# Patient Record
Sex: FEMALE | Race: BLACK OR AFRICAN AMERICAN | NOT HISPANIC OR LATINO | URBAN - METROPOLITAN AREA
[De-identification: names, ages, dates, MRNs, and addresses within clinical notes are randomized per-mention and may not be internally consistent; named-entity substitution may affect disease eponyms.]

---

## 2018-01-28 ENCOUNTER — EMERGENCY (EMERGENCY)
Facility: HOSPITAL | Age: 78
LOS: 1 days | Discharge: ANOTHER HOSPITAL | End: 2018-01-28
Attending: EMERGENCY MEDICINE
Payer: COMMERCIAL

## 2018-01-28 VITALS
SYSTOLIC BLOOD PRESSURE: 183 MMHG | RESPIRATION RATE: 18 BRPM | OXYGEN SATURATION: 100 % | DIASTOLIC BLOOD PRESSURE: 79 MMHG | HEART RATE: 57 BPM | TEMPERATURE: 98 F

## 2018-01-28 LAB
ALBUMIN SERPL ELPH-MCNC: 4.1 G/DL — SIGNIFICANT CHANGE UP (ref 3.5–5)
ALP SERPL-CCNC: 56 U/L — SIGNIFICANT CHANGE UP (ref 40–120)
ALT FLD-CCNC: 36 U/L DA — SIGNIFICANT CHANGE UP (ref 10–60)
ANION GAP SERPL CALC-SCNC: 9 MMOL/L — SIGNIFICANT CHANGE UP (ref 5–17)
AST SERPL-CCNC: 52 U/L — HIGH (ref 10–40)
BILIRUB SERPL-MCNC: 0.5 MG/DL — SIGNIFICANT CHANGE UP (ref 0.2–1.2)
BUN SERPL-MCNC: 7 MG/DL — SIGNIFICANT CHANGE UP (ref 7–18)
CALCIUM SERPL-MCNC: 9.6 MG/DL — SIGNIFICANT CHANGE UP (ref 8.4–10.5)
CHLORIDE SERPL-SCNC: 98 MMOL/L — SIGNIFICANT CHANGE UP (ref 96–108)
CO2 SERPL-SCNC: 25 MMOL/L — SIGNIFICANT CHANGE UP (ref 22–31)
CREAT SERPL-MCNC: 0.72 MG/DL — SIGNIFICANT CHANGE UP (ref 0.5–1.3)
GLUCOSE SERPL-MCNC: 121 MG/DL — HIGH (ref 70–99)
HCT VFR BLD CALC: 47.9 % — HIGH (ref 34.5–45)
HGB BLD-MCNC: 15.2 G/DL — SIGNIFICANT CHANGE UP (ref 11.5–15.5)
LIDOCAIN IGE QN: 69 U/L — LOW (ref 73–393)
MCHC RBC-ENTMCNC: 30 PG — SIGNIFICANT CHANGE UP (ref 27–34)
MCHC RBC-ENTMCNC: 31.8 GM/DL — LOW (ref 32–36)
MCV RBC AUTO: 94.3 FL — SIGNIFICANT CHANGE UP (ref 80–100)
PLATELET # BLD AUTO: 259 K/UL — SIGNIFICANT CHANGE UP (ref 150–400)
POTASSIUM SERPL-MCNC: 4.8 MMOL/L — SIGNIFICANT CHANGE UP (ref 3.5–5.3)
POTASSIUM SERPL-SCNC: 4.8 MMOL/L — SIGNIFICANT CHANGE UP (ref 3.5–5.3)
PROT SERPL-MCNC: 8.9 G/DL — HIGH (ref 6–8.3)
RBC # BLD: 5.08 M/UL — SIGNIFICANT CHANGE UP (ref 3.8–5.2)
RBC # FLD: 12.7 % — SIGNIFICANT CHANGE UP (ref 10.3–14.5)
SODIUM SERPL-SCNC: 132 MMOL/L — LOW (ref 135–145)
WBC # BLD: 10.2 K/UL — SIGNIFICANT CHANGE UP (ref 3.8–10.5)
WBC # FLD AUTO: 10.2 K/UL — SIGNIFICANT CHANGE UP (ref 3.8–10.5)

## 2018-01-28 PROCEDURE — 96376 TX/PRO/DX INJ SAME DRUG ADON: CPT

## 2018-01-28 PROCEDURE — 96375 TX/PRO/DX INJ NEW DRUG ADDON: CPT

## 2018-01-28 PROCEDURE — 99284 EMERGENCY DEPT VISIT MOD MDM: CPT | Mod: 25

## 2018-01-28 PROCEDURE — 96374 THER/PROPH/DIAG INJ IV PUSH: CPT | Mod: XU

## 2018-01-28 PROCEDURE — 74177 CT ABD & PELVIS W/CONTRAST: CPT | Mod: 26

## 2018-01-28 PROCEDURE — 80053 COMPREHEN METABOLIC PANEL: CPT

## 2018-01-28 PROCEDURE — 85027 COMPLETE CBC AUTOMATED: CPT

## 2018-01-28 PROCEDURE — 74177 CT ABD & PELVIS W/CONTRAST: CPT

## 2018-01-28 PROCEDURE — 99285 EMERGENCY DEPT VISIT HI MDM: CPT

## 2018-01-28 PROCEDURE — 83690 ASSAY OF LIPASE: CPT

## 2018-01-28 RX ORDER — KETOROLAC TROMETHAMINE 30 MG/ML
15 SYRINGE (ML) INJECTION ONCE
Qty: 0 | Refills: 0 | Status: DISCONTINUED | OUTPATIENT
Start: 2018-01-28 | End: 2018-01-28

## 2018-01-28 RX ORDER — METOCLOPRAMIDE HCL 10 MG
10 TABLET ORAL ONCE
Qty: 0 | Refills: 0 | Status: COMPLETED | OUTPATIENT
Start: 2018-01-28 | End: 2018-01-28

## 2018-01-28 RX ORDER — SODIUM CHLORIDE 9 MG/ML
2000 INJECTION INTRAMUSCULAR; INTRAVENOUS; SUBCUTANEOUS ONCE
Qty: 0 | Refills: 0 | Status: COMPLETED | OUTPATIENT
Start: 2018-01-28 | End: 2018-01-28

## 2018-01-28 RX ORDER — MORPHINE SULFATE 50 MG/1
4 CAPSULE, EXTENDED RELEASE ORAL ONCE
Qty: 0 | Refills: 0 | Status: DISCONTINUED | OUTPATIENT
Start: 2018-01-28 | End: 2018-01-28

## 2018-01-28 RX ORDER — ONDANSETRON 8 MG/1
4 TABLET, FILM COATED ORAL ONCE
Qty: 0 | Refills: 0 | Status: COMPLETED | OUTPATIENT
Start: 2018-01-28 | End: 2018-01-28

## 2018-01-28 RX ADMIN — MORPHINE SULFATE 4 MILLIGRAM(S): 50 CAPSULE, EXTENDED RELEASE ORAL at 19:34

## 2018-01-28 RX ADMIN — SODIUM CHLORIDE 2000 MILLILITER(S): 9 INJECTION INTRAMUSCULAR; INTRAVENOUS; SUBCUTANEOUS at 19:02

## 2018-01-28 RX ADMIN — Medication 10 MILLIGRAM(S): at 20:31

## 2018-01-28 RX ADMIN — Medication 15 MILLIGRAM(S): at 19:02

## 2018-01-28 RX ADMIN — MORPHINE SULFATE 4 MILLIGRAM(S): 50 CAPSULE, EXTENDED RELEASE ORAL at 20:36

## 2018-01-28 RX ADMIN — ONDANSETRON 4 MILLIGRAM(S): 8 TABLET, FILM COATED ORAL at 19:34

## 2018-01-28 RX ADMIN — Medication 15 MILLIGRAM(S): at 20:36

## 2018-01-28 RX ADMIN — ONDANSETRON 4 MILLIGRAM(S): 8 TABLET, FILM COATED ORAL at 19:02

## 2018-01-28 NOTE — ED PROVIDER NOTE - GASTROINTESTINAL, MLM
Bilateral lower abdomen tenderness or cva tenderness Bilateral lower abdomen tenderness no cva tenderness

## 2018-01-28 NOTE — ED PROVIDER NOTE - MEDICAL DECISION MAKING DETAILS
Pt is a 78 y/o F c/o abdominal pain. Concern of atrial abdominal process r/o obstruction appendicitis and reassess after result of ct abdomen and pelvis.

## 2018-01-28 NOTE — ED PROVIDER NOTE - OBJECTIVE STATEMENT
Pt is a 76 y/o F with no significant PMHx of and no significant PSHx presents to the ED c/o vomiting and abdominal pain x 1 day. Pt states that she came from an urgent care. Pt reports that her pain started before vomiting and is currently present as well. Pt denies f/c, cough, diarrhea or any other complaints. NKDA or allergies.

## 2018-01-29 VITALS
DIASTOLIC BLOOD PRESSURE: 81 MMHG | RESPIRATION RATE: 18 BRPM | HEART RATE: 60 BPM | SYSTOLIC BLOOD PRESSURE: 170 MMHG | OXYGEN SATURATION: 100 % | TEMPERATURE: 98 F

## 2018-01-30 ENCOUNTER — INPATIENT (INPATIENT)
Facility: HOSPITAL | Age: 78
LOS: 8 days | Discharge: HOME | End: 2018-02-08
Attending: INTERNAL MEDICINE | Admitting: INTERNAL MEDICINE
Payer: MEDICARE

## 2018-01-30 ENCOUNTER — EMERGENCY (EMERGENCY)
Facility: HOSPITAL | Age: 78
LOS: 1 days | Discharge: ANOTHER HOSPITAL | End: 2018-01-30
Attending: EMERGENCY MEDICINE | Admitting: EMERGENCY MEDICINE
Payer: COMMERCIAL

## 2018-01-30 VITALS
TEMPERATURE: 99 F | HEART RATE: 126 BPM | DIASTOLIC BLOOD PRESSURE: 93 MMHG | WEIGHT: 119.93 LBS | HEIGHT: 63 IN | SYSTOLIC BLOOD PRESSURE: 147 MMHG | RESPIRATION RATE: 18 BRPM | OXYGEN SATURATION: 98 %

## 2018-01-30 VITALS
OXYGEN SATURATION: 97 % | DIASTOLIC BLOOD PRESSURE: 76 MMHG | SYSTOLIC BLOOD PRESSURE: 146 MMHG | TEMPERATURE: 101 F | HEART RATE: 120 BPM | RESPIRATION RATE: 18 BRPM

## 2018-01-30 VITALS
DIASTOLIC BLOOD PRESSURE: 58 MMHG | HEART RATE: 84 BPM | TEMPERATURE: 98 F | WEIGHT: 108.03 LBS | RESPIRATION RATE: 17 BRPM | SYSTOLIC BLOOD PRESSURE: 103 MMHG | HEIGHT: 60 IN | OXYGEN SATURATION: 97 %

## 2018-01-30 DIAGNOSIS — I21.4 NON-ST ELEVATION (NSTEMI) MYOCARDIAL INFARCTION: ICD-10-CM

## 2018-01-30 DIAGNOSIS — Z98.890 OTHER SPECIFIED POSTPROCEDURAL STATES: Chronic | ICD-10-CM

## 2018-01-30 DIAGNOSIS — E86.0 DEHYDRATION: ICD-10-CM

## 2018-01-30 DIAGNOSIS — I10 ESSENTIAL (PRIMARY) HYPERTENSION: ICD-10-CM

## 2018-01-30 DIAGNOSIS — R11.2 NAUSEA WITH VOMITING, UNSPECIFIED: ICD-10-CM

## 2018-01-30 DIAGNOSIS — R10.84 GENERALIZED ABDOMINAL PAIN: ICD-10-CM

## 2018-01-30 DIAGNOSIS — R50.9 FEVER, UNSPECIFIED: ICD-10-CM

## 2018-01-30 LAB
ALBUMIN SERPL ELPH-MCNC: 4.2 G/DL — SIGNIFICANT CHANGE UP (ref 3.5–5)
ALP SERPL-CCNC: 59 U/L — SIGNIFICANT CHANGE UP (ref 40–120)
ALT FLD-CCNC: 44 U/L DA — SIGNIFICANT CHANGE UP (ref 10–60)
ANION GAP SERPL CALC-SCNC: 10 MMOL/L — SIGNIFICANT CHANGE UP (ref 5–17)
APPEARANCE UR: CLEAR — SIGNIFICANT CHANGE UP
AST SERPL-CCNC: 60 U/L — HIGH (ref 10–40)
B PERT DNA SPEC QL NAA+PROBE: SIGNIFICANT CHANGE UP
BASOPHILS # BLD AUTO: 0.1 K/UL — SIGNIFICANT CHANGE UP (ref 0–0.2)
BASOPHILS NFR BLD AUTO: 0.8 % — SIGNIFICANT CHANGE UP (ref 0–2)
BILIRUB SERPL-MCNC: 0.8 MG/DL — SIGNIFICANT CHANGE UP (ref 0.2–1.2)
BILIRUB UR-MCNC: NEGATIVE — SIGNIFICANT CHANGE UP
BUN SERPL-MCNC: 11 MG/DL — SIGNIFICANT CHANGE UP (ref 7–18)
C PNEUM DNA SPEC QL NAA+PROBE: NOT DETECTED — SIGNIFICANT CHANGE UP
CALCIUM SERPL-MCNC: 9.5 MG/DL — SIGNIFICANT CHANGE UP (ref 8.4–10.5)
CHLORIDE SERPL-SCNC: 97 MMOL/L — SIGNIFICANT CHANGE UP (ref 96–108)
CO2 SERPL-SCNC: 29 MMOL/L — SIGNIFICANT CHANGE UP (ref 22–31)
COLOR SPEC: YELLOW — SIGNIFICANT CHANGE UP
CREAT SERPL-MCNC: 0.74 MG/DL — SIGNIFICANT CHANGE UP (ref 0.5–1.3)
DIFF PNL FLD: ABNORMAL
EOSINOPHIL # BLD AUTO: 0 K/UL — SIGNIFICANT CHANGE UP (ref 0–0.5)
EOSINOPHIL NFR BLD AUTO: 0 % — SIGNIFICANT CHANGE UP (ref 0–6)
FLUAV H1 2009 PAND RNA SPEC QL NAA+PROBE: NOT DETECTED — SIGNIFICANT CHANGE UP
FLUAV H1 RNA SPEC QL NAA+PROBE: NOT DETECTED — SIGNIFICANT CHANGE UP
FLUAV H3 RNA SPEC QL NAA+PROBE: NOT DETECTED — SIGNIFICANT CHANGE UP
FLUAV SUBTYP SPEC NAA+PROBE: SIGNIFICANT CHANGE UP
FLUBV RNA SPEC QL NAA+PROBE: NOT DETECTED — SIGNIFICANT CHANGE UP
GLUCOSE SERPL-MCNC: 115 MG/DL — HIGH (ref 70–99)
GLUCOSE UR QL: NEGATIVE — SIGNIFICANT CHANGE UP
HADV DNA SPEC QL NAA+PROBE: NOT DETECTED — SIGNIFICANT CHANGE UP
HCOV 229E RNA SPEC QL NAA+PROBE: NOT DETECTED — SIGNIFICANT CHANGE UP
HCOV HKU1 RNA SPEC QL NAA+PROBE: NOT DETECTED — SIGNIFICANT CHANGE UP
HCOV NL63 RNA SPEC QL NAA+PROBE: NOT DETECTED — SIGNIFICANT CHANGE UP
HCOV OC43 RNA SPEC QL NAA+PROBE: NOT DETECTED — SIGNIFICANT CHANGE UP
HCT VFR BLD CALC: 52.3 % — HIGH (ref 34.5–45)
HGB BLD-MCNC: 16.6 G/DL — HIGH (ref 11.5–15.5)
HMPV RNA SPEC QL NAA+PROBE: NOT DETECTED — SIGNIFICANT CHANGE UP
HPIV1 RNA SPEC QL NAA+PROBE: NOT DETECTED — SIGNIFICANT CHANGE UP
HPIV2 RNA SPEC QL NAA+PROBE: NOT DETECTED — SIGNIFICANT CHANGE UP
HPIV3 RNA SPEC QL NAA+PROBE: NOT DETECTED — SIGNIFICANT CHANGE UP
HPIV4 RNA SPEC QL NAA+PROBE: NOT DETECTED — SIGNIFICANT CHANGE UP
KETONES UR-MCNC: ABNORMAL
LACTATE SERPL-SCNC: 1.8 MMOL/L — SIGNIFICANT CHANGE UP (ref 0.7–2)
LEUKOCYTE ESTERASE UR-ACNC: ABNORMAL
LIDOCAIN IGE QN: 79 U/L — SIGNIFICANT CHANGE UP (ref 73–393)
LYMPHOCYTES # BLD AUTO: 1.6 K/UL — SIGNIFICANT CHANGE UP (ref 1–3.3)
LYMPHOCYTES # BLD AUTO: 12.2 % — LOW (ref 13–44)
M PNEUMO DNA SPEC QL NAA+PROBE: NOT DETECTED — SIGNIFICANT CHANGE UP
MCHC RBC-ENTMCNC: 29.3 PG — SIGNIFICANT CHANGE UP (ref 27–34)
MCHC RBC-ENTMCNC: 31.7 GM/DL — LOW (ref 32–36)
MCV RBC AUTO: 92.3 FL — SIGNIFICANT CHANGE UP (ref 80–100)
MONOCYTES # BLD AUTO: 0.9 K/UL — SIGNIFICANT CHANGE UP (ref 0–0.9)
MONOCYTES NFR BLD AUTO: 7.3 % — SIGNIFICANT CHANGE UP (ref 2–14)
NEUTROPHILS # BLD AUTO: 10.1 K/UL — HIGH (ref 1.8–7.4)
NEUTROPHILS NFR BLD AUTO: 79.6 % — HIGH (ref 43–77)
NITRITE UR-MCNC: NEGATIVE — SIGNIFICANT CHANGE UP
PH UR: 8 — SIGNIFICANT CHANGE UP (ref 5–8)
PLATELET # BLD AUTO: 267 K/UL — SIGNIFICANT CHANGE UP (ref 150–400)
POTASSIUM SERPL-MCNC: 4 MMOL/L — SIGNIFICANT CHANGE UP (ref 3.5–5.3)
POTASSIUM SERPL-SCNC: 4 MMOL/L — SIGNIFICANT CHANGE UP (ref 3.5–5.3)
PROT SERPL-MCNC: 8.8 G/DL — HIGH (ref 6–8.3)
PROT UR-MCNC: 30 MG/DL
RBC # BLD: 5.67 M/UL — HIGH (ref 3.8–5.2)
RBC # FLD: 12.8 % — SIGNIFICANT CHANGE UP (ref 10.3–14.5)
RSV RNA SPEC QL NAA+PROBE: NOT DETECTED — SIGNIFICANT CHANGE UP
RV+EV RNA SPEC QL NAA+PROBE: NOT DETECTED — SIGNIFICANT CHANGE UP
SODIUM SERPL-SCNC: 136 MMOL/L — SIGNIFICANT CHANGE UP (ref 135–145)
SP GR SPEC: 1.01 — SIGNIFICANT CHANGE UP (ref 1.01–1.02)
TROPONIN I SERPL-MCNC: 1.73 NG/ML — HIGH (ref 0–0.04)
UROBILINOGEN FLD QL: NEGATIVE — SIGNIFICANT CHANGE UP
WBC # BLD: 12.7 K/UL — HIGH (ref 3.8–10.5)
WBC # FLD AUTO: 12.7 K/UL — HIGH (ref 3.8–10.5)

## 2018-01-30 PROCEDURE — 99285 EMERGENCY DEPT VISIT HI MDM: CPT

## 2018-01-30 PROCEDURE — 93458 L HRT ARTERY/VENTRICLE ANGIO: CPT | Mod: 26,GC

## 2018-01-30 PROCEDURE — 83605 ASSAY OF LACTIC ACID: CPT

## 2018-01-30 PROCEDURE — 85027 COMPLETE CBC AUTOMATED: CPT

## 2018-01-30 PROCEDURE — 99285 EMERGENCY DEPT VISIT HI MDM: CPT | Mod: 25

## 2018-01-30 PROCEDURE — 96374 THER/PROPH/DIAG INJ IV PUSH: CPT

## 2018-01-30 PROCEDURE — 96375 TX/PRO/DX INJ NEW DRUG ADDON: CPT

## 2018-01-30 PROCEDURE — 83690 ASSAY OF LIPASE: CPT

## 2018-01-30 PROCEDURE — 80053 COMPREHEN METABOLIC PANEL: CPT

## 2018-01-30 PROCEDURE — 93005 ELECTROCARDIOGRAM TRACING: CPT

## 2018-01-30 PROCEDURE — 96376 TX/PRO/DX INJ SAME DRUG ADON: CPT

## 2018-01-30 PROCEDURE — 81001 URINALYSIS AUTO W/SCOPE: CPT

## 2018-01-30 PROCEDURE — 84484 ASSAY OF TROPONIN QUANT: CPT

## 2018-01-30 RX ORDER — SODIUM CHLORIDE 9 MG/ML
3 INJECTION INTRAMUSCULAR; INTRAVENOUS; SUBCUTANEOUS EVERY 8 HOURS
Qty: 0 | Refills: 0 | Status: DISCONTINUED | OUTPATIENT
Start: 2018-01-30 | End: 2018-02-08

## 2018-01-30 RX ORDER — FAMOTIDINE 10 MG/ML
20 INJECTION INTRAVENOUS ONCE
Qty: 0 | Refills: 0 | Status: COMPLETED | OUTPATIENT
Start: 2018-01-30 | End: 2018-01-30

## 2018-01-30 RX ORDER — ASPIRIN/CALCIUM CARB/MAGNESIUM 324 MG
81 TABLET ORAL DAILY
Qty: 0 | Refills: 0 | Status: DISCONTINUED | OUTPATIENT
Start: 2018-01-31 | End: 2018-02-08

## 2018-01-30 RX ORDER — AMLODIPINE BESYLATE 2.5 MG/1
5 TABLET ORAL DAILY
Qty: 0 | Refills: 0 | Status: DISCONTINUED | OUTPATIENT
Start: 2018-01-30 | End: 2018-02-08

## 2018-01-30 RX ORDER — ACETAMINOPHEN 500 MG
975 TABLET ORAL ONCE
Qty: 0 | Refills: 0 | Status: COMPLETED | OUTPATIENT
Start: 2018-01-30 | End: 2018-01-30

## 2018-01-30 RX ORDER — CLOPIDOGREL BISULFATE 75 MG/1
300 TABLET, FILM COATED ORAL ONCE
Qty: 0 | Refills: 0 | Status: COMPLETED | OUTPATIENT
Start: 2018-01-30 | End: 2018-01-30

## 2018-01-30 RX ORDER — ONDANSETRON 8 MG/1
4 TABLET, FILM COATED ORAL ONCE
Qty: 0 | Refills: 0 | Status: COMPLETED | OUTPATIENT
Start: 2018-01-30 | End: 2018-01-30

## 2018-01-30 RX ORDER — SODIUM CHLORIDE 9 MG/ML
1000 INJECTION INTRAMUSCULAR; INTRAVENOUS; SUBCUTANEOUS ONCE
Qty: 0 | Refills: 0 | Status: COMPLETED | OUTPATIENT
Start: 2018-01-30 | End: 2018-01-30

## 2018-01-30 RX ORDER — METOPROLOL TARTRATE 50 MG
12.5 TABLET ORAL
Qty: 0 | Refills: 0 | Status: DISCONTINUED | OUTPATIENT
Start: 2018-01-30 | End: 2018-02-08

## 2018-01-30 RX ORDER — ASPIRIN/CALCIUM CARB/MAGNESIUM 324 MG
325 TABLET ORAL ONCE
Qty: 0 | Refills: 0 | Status: COMPLETED | OUTPATIENT
Start: 2018-01-30 | End: 2018-01-30

## 2018-01-30 RX ADMIN — AMLODIPINE BESYLATE 5 MILLIGRAM(S): 2.5 TABLET ORAL at 18:19

## 2018-01-30 RX ADMIN — Medication 1 MILLIGRAM(S): at 10:40

## 2018-01-30 RX ADMIN — CLOPIDOGREL BISULFATE 300 MILLIGRAM(S): 75 TABLET, FILM COATED ORAL at 12:46

## 2018-01-30 RX ADMIN — FAMOTIDINE 20 MILLIGRAM(S): 10 INJECTION INTRAVENOUS at 10:39

## 2018-01-30 RX ADMIN — Medication 12.5 MILLIGRAM(S): at 18:19

## 2018-01-30 RX ADMIN — Medication 325 MILLIGRAM(S): at 11:33

## 2018-01-30 RX ADMIN — ONDANSETRON 4 MILLIGRAM(S): 8 TABLET, FILM COATED ORAL at 12:46

## 2018-01-30 RX ADMIN — Medication 975 MILLIGRAM(S): at 11:33

## 2018-01-30 RX ADMIN — SODIUM CHLORIDE 2000 MILLILITER(S): 9 INJECTION INTRAMUSCULAR; INTRAVENOUS; SUBCUTANEOUS at 10:46

## 2018-01-30 RX ADMIN — ONDANSETRON 4 MILLIGRAM(S): 8 TABLET, FILM COATED ORAL at 10:40

## 2018-01-30 RX ADMIN — ONDANSETRON 4 MILLIGRAM(S): 8 TABLET, FILM COATED ORAL at 17:44

## 2018-01-30 RX ADMIN — SODIUM CHLORIDE 3 MILLILITER(S): 9 INJECTION INTRAMUSCULAR; INTRAVENOUS; SUBCUTANEOUS at 22:02

## 2018-01-30 NOTE — H&P CARDIOLOGY - HISTORY OF PRESENT ILLNESS
77 year old female with HTN who presented to [facility] ED 1/28/18 c/o nausea and abdominal pain with subjective fevers and chills x3days, CT abd/pelvis negative and discharged to home, subsequently re-presented to [facility] ED 1/30 c/o persistent symptoms with increased intensity. R/I for MI with first troponin 1.7 with EKG with mild SNOW. Patient given NS IVG bolus, Zofran and Tylenol, as well as lorazepam for abd muscle relaxation, with improvement in symptoms along with ASA. Patient urgently transferred to [facility] with an urgent cardiac catheterization on arrival via RRA access revealing EF and normal coronary arteries. , to be started on ACE-I and BB as per Dr VASILE Red and to be admitted to telemetry for further management.   Patient currently asymptomatic, chest pain free and afebrile. 77 year old female with HTN who presented to [facility] ED 1/28/18 c/o nausea and abdominal pain with subjective fevers and chills x3days, CT abd/pelvis No bowel obstruction. No wall thickening or pericolonic inflammatory change. Mild intrahepatic biliary duct dilatation of uncertain etiology. No gallstones or CBD dilatation. 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   Patient was subsequently discharged to home, subsequently re-presented to [facility] ED 1/30 c/o persistent symptoms with increased intensity. R/I for MI with first troponin 1.7 with EKG with mild SNOW. Patient given NS IVG bolus, Zofran and Tylenol, as well as lorazepam for abd muscle relaxation, with improvement in symptoms along with ASA. Patient urgently transferred to [facility] with an urgent cardiac catheterization on arrival via RRA access revealing EF 40%, LVEDP 15, normal coronary arteries and to be started on ACE-I and BB as per Dr VASILE Red and to be admitted to telemetry for further management.   Patient currently asymptomatic, chest pain and abdominal pain free and afebrile.

## 2018-01-30 NOTE — H&P CARDIOLOGY - COMMENTS
RVP swab ordered  TTE ordered  will start low dose BB and ACE-I as discussed with Dr. VASILE Red  admit to tele

## 2018-01-30 NOTE — ED PROVIDER NOTE - CHPI ED SYMPTOMS NEG
no chest pain, no shortness of breath, no leg pain, no leg swelling/no dysuria/no hematuria/no diarrhea

## 2018-01-30 NOTE — ED PROVIDER NOTE - PHYSICAL EXAMINATION
Afebrile, hemodynamically stable  NAD, ill but non-toxic appearing  Head NCAT  EOMI  MM mildly dry  No JVD  Tachy, reg rhythm, nml S1/S2, no m/r/g  Lungs CTAB, no w/r/r  Abd soft, diffuse mild TTP with no rebound or guarding, ND, nml BS  AAO, CN's 3-12 grossly intact  GALLEGOS spontaneously, no leg cyanosis or edema, 2+ symmetric radial and DP pulses  Skin warm, dry

## 2018-01-30 NOTE — ED PROVIDER NOTE - CARE PLAN
Principal Discharge DX:	Generalized abdominal pain  Secondary Diagnosis:	NSTEMI, initial episode of care  Secondary Diagnosis:	Dehydration, moderate

## 2018-01-30 NOTE — ED PROVIDER NOTE - OBJECTIVE STATEMENT
76 y/o F pt with PMHx of HTN presents to ED c/o worsening abd pain, nausea, vomiting (clear liquid), and not tolerating PO x 3 days. Pt was seen at [facility] ED on 01/28/2018 for same symptoms, had a CT and was discharge to home. Pt describes her abd pain as dull, constant, and does not report any alleviating or aggravating factors. Pt has taken Pepto Bismol and emetrol with no symptomatic relief. Pt denies h/o similar symptoms, chest pain, shortness of breath, dysuria, hematuria, diarrhea, leg pain, leg swelling, or any other complaints. Last BM was 3 days ago but pt denies constipation. 76 y/o F pt with PMHx of HTN and remote laparotomy for nonspecific abdominal pain, presents to ED c/o worsening abd pain, nausea, vomiting clear liquid, and decreased appetite x 2 days. [facility] symptoms, had a negative CT and was discharged home. Pt describes her abd pain as primarily periumbilical though also generalized, dull, constant, and does not report any alleviating or aggravating factors. Associated subjective fevers and chills. Last BM was 3 days ago but pt denies constipation. Pt has taken Pepto Bismol and emetrol with no symptomatic relief. Pt denies h/o similar symptoms, chest pain, shortness of breath, dysuria, hematuria, diarrhea, leg pain, leg swelling, or any other complaints.

## 2018-01-31 DIAGNOSIS — R11.0 NAUSEA: ICD-10-CM

## 2018-01-31 DIAGNOSIS — R10.13 EPIGASTRIC PAIN: ICD-10-CM

## 2018-01-31 LAB
APTT BLD: 29.7 SEC — SIGNIFICANT CHANGE UP (ref 27.5–37.4)
BASE EXCESS BLDA CALC-SCNC: 6.3 MMOL/L — SIGNIFICANT CHANGE UP
BLD GP AB SCN SERPL QL: NEGATIVE — SIGNIFICANT CHANGE UP
BUN SERPL-MCNC: 26 MG/DL — HIGH (ref 7–23)
BUN SERPL-MCNC: 30 MG/DL — HIGH (ref 7–23)
CALCIUM SERPL-MCNC: 9 MG/DL — SIGNIFICANT CHANGE UP (ref 8.4–10.5)
CALCIUM SERPL-MCNC: 9.7 MG/DL — SIGNIFICANT CHANGE UP (ref 8.4–10.5)
CHLORIDE BLDA-SCNC: 98 MMOL/L — SIGNIFICANT CHANGE UP (ref 96–108)
CHLORIDE SERPL-SCNC: 100 MMOL/L — SIGNIFICANT CHANGE UP (ref 98–107)
CHLORIDE SERPL-SCNC: 94 MMOL/L — LOW (ref 98–107)
CO2 SERPL-SCNC: 26 MMOL/L — SIGNIFICANT CHANGE UP (ref 22–31)
CO2 SERPL-SCNC: 27 MMOL/L — SIGNIFICANT CHANGE UP (ref 22–31)
CREAT SERPL-MCNC: 1.41 MG/DL — HIGH (ref 0.5–1.3)
CREAT SERPL-MCNC: 1.5 MG/DL — HIGH (ref 0.5–1.3)
GLUCOSE BLDA-MCNC: 121 MG/DL — HIGH (ref 70–99)
GLUCOSE SERPL-MCNC: 103 MG/DL — HIGH (ref 70–99)
GLUCOSE SERPL-MCNC: 119 MG/DL — HIGH (ref 70–99)
HCO3 BLDA-SCNC: 30 MMOL/L — HIGH (ref 22–26)
HCT VFR BLD CALC: 46 % — HIGH (ref 34.5–45)
HCT VFR BLD CALC: 49.2 % — HIGH (ref 34.5–45)
HCT VFR BLDA CALC: 45.3 % — SIGNIFICANT CHANGE UP (ref 34.5–46.5)
HGB BLD-MCNC: 15.2 G/DL — SIGNIFICANT CHANGE UP (ref 11.5–15.5)
HGB BLD-MCNC: 17 G/DL — HIGH (ref 11.5–15.5)
HGB BLDA-MCNC: 14.8 G/DL — SIGNIFICANT CHANGE UP (ref 11.5–15.5)
INR BLD: 0.98 — SIGNIFICANT CHANGE UP (ref 0.88–1.17)
LACTATE BLDA-SCNC: 1.3 MMOL/L — SIGNIFICANT CHANGE UP (ref 0.5–2)
LACTATE SERPL-SCNC: 1.7 MMOL/L — SIGNIFICANT CHANGE UP (ref 0.5–2)
MAGNESIUM SERPL-MCNC: 2.5 MG/DL — SIGNIFICANT CHANGE UP (ref 1.6–2.6)
MCHC RBC-ENTMCNC: 29.3 PG — SIGNIFICANT CHANGE UP (ref 27–34)
MCHC RBC-ENTMCNC: 30.6 PG — SIGNIFICANT CHANGE UP (ref 27–34)
MCHC RBC-ENTMCNC: 33 % — SIGNIFICANT CHANGE UP (ref 32–36)
MCHC RBC-ENTMCNC: 34.6 % — SIGNIFICANT CHANGE UP (ref 32–36)
MCV RBC AUTO: 88.5 FL — SIGNIFICANT CHANGE UP (ref 80–100)
MCV RBC AUTO: 88.6 FL — SIGNIFICANT CHANGE UP (ref 80–100)
NRBC # FLD: 0 — SIGNIFICANT CHANGE UP
NRBC # FLD: 0 — SIGNIFICANT CHANGE UP
PCO2 BLDA: 41 MMHG — SIGNIFICANT CHANGE UP (ref 32–48)
PH BLDA: 7.48 PH — HIGH (ref 7.35–7.45)
PLATELET # BLD AUTO: 237 K/UL — SIGNIFICANT CHANGE UP (ref 150–400)
PLATELET # BLD AUTO: 298 K/UL — SIGNIFICANT CHANGE UP (ref 150–400)
PMV BLD: 10.1 FL — SIGNIFICANT CHANGE UP (ref 7–13)
PMV BLD: 9.7 FL — SIGNIFICANT CHANGE UP (ref 7–13)
PO2 BLDA: 125 MMHG — HIGH (ref 83–108)
POTASSIUM BLDA-SCNC: 3.4 MMOL/L — SIGNIFICANT CHANGE UP (ref 3.4–4.5)
POTASSIUM SERPL-MCNC: 3.9 MMOL/L — SIGNIFICANT CHANGE UP (ref 3.5–5.3)
POTASSIUM SERPL-MCNC: 4.1 MMOL/L — SIGNIFICANT CHANGE UP (ref 3.5–5.3)
POTASSIUM SERPL-SCNC: 3.9 MMOL/L — SIGNIFICANT CHANGE UP (ref 3.5–5.3)
POTASSIUM SERPL-SCNC: 4.1 MMOL/L — SIGNIFICANT CHANGE UP (ref 3.5–5.3)
PROTHROM AB SERPL-ACNC: 11.3 SEC — SIGNIFICANT CHANGE UP (ref 9.8–13.1)
RBC # BLD: 5.19 M/UL — SIGNIFICANT CHANGE UP (ref 3.8–5.2)
RBC # BLD: 5.56 M/UL — HIGH (ref 3.8–5.2)
RBC # FLD: 13.6 % — SIGNIFICANT CHANGE UP (ref 10.3–14.5)
RBC # FLD: 13.7 % — SIGNIFICANT CHANGE UP (ref 10.3–14.5)
RH IG SCN BLD-IMP: POSITIVE — SIGNIFICANT CHANGE UP
RH IG SCN BLD-IMP: POSITIVE — SIGNIFICANT CHANGE UP
SAO2 % BLDA: 98.8 % — SIGNIFICANT CHANGE UP (ref 95–99)
SODIUM BLDA-SCNC: 136 MMOL/L — SIGNIFICANT CHANGE UP (ref 136–146)
SODIUM SERPL-SCNC: 137 MMOL/L — SIGNIFICANT CHANGE UP (ref 135–145)
SODIUM SERPL-SCNC: 139 MMOL/L — SIGNIFICANT CHANGE UP (ref 135–145)
WBC # BLD: 11.98 K/UL — HIGH (ref 3.8–10.5)
WBC # BLD: 13.38 K/UL — HIGH (ref 3.8–10.5)
WBC # FLD AUTO: 11.98 K/UL — HIGH (ref 3.8–10.5)
WBC # FLD AUTO: 13.38 K/UL — HIGH (ref 3.8–10.5)

## 2018-01-31 PROCEDURE — 74018 RADEX ABDOMEN 1 VIEW: CPT | Mod: 26

## 2018-01-31 RX ORDER — METOCLOPRAMIDE HCL 10 MG
5 TABLET ORAL EVERY 6 HOURS
Qty: 0 | Refills: 0 | Status: COMPLETED | OUTPATIENT
Start: 2018-01-31 | End: 2018-02-05

## 2018-01-31 RX ORDER — ONDANSETRON 8 MG/1
4 TABLET, FILM COATED ORAL EVERY 6 HOURS
Qty: 0 | Refills: 0 | Status: DISCONTINUED | OUTPATIENT
Start: 2018-01-31 | End: 2018-02-06

## 2018-01-31 RX ORDER — PANTOPRAZOLE SODIUM 20 MG/1
40 TABLET, DELAYED RELEASE ORAL
Qty: 0 | Refills: 0 | Status: DISCONTINUED | OUTPATIENT
Start: 2018-01-31 | End: 2018-02-08

## 2018-01-31 RX ORDER — SUCRALFATE 1 G
1 TABLET ORAL
Qty: 0 | Refills: 0 | Status: DISCONTINUED | OUTPATIENT
Start: 2018-01-31 | End: 2018-02-08

## 2018-01-31 RX ORDER — SODIUM CHLORIDE 9 MG/ML
1000 INJECTION, SOLUTION INTRAVENOUS
Qty: 0 | Refills: 0 | Status: DISCONTINUED | OUTPATIENT
Start: 2018-01-31 | End: 2018-02-03

## 2018-01-31 RX ORDER — ONDANSETRON 8 MG/1
4 TABLET, FILM COATED ORAL ONCE
Qty: 0 | Refills: 0 | Status: COMPLETED | OUTPATIENT
Start: 2018-01-31 | End: 2018-01-31

## 2018-01-31 RX ORDER — MORPHINE SULFATE 50 MG/1
2 CAPSULE, EXTENDED RELEASE ORAL ONCE
Qty: 0 | Refills: 0 | Status: DISCONTINUED | OUTPATIENT
Start: 2018-01-31 | End: 2018-01-31

## 2018-01-31 RX ORDER — SIMETHICONE 80 MG/1
80 TABLET, CHEWABLE ORAL
Qty: 0 | Refills: 0 | Status: DISCONTINUED | OUTPATIENT
Start: 2018-01-31 | End: 2018-02-08

## 2018-01-31 RX ADMIN — Medication 81 MILLIGRAM(S): at 11:17

## 2018-01-31 RX ADMIN — SODIUM CHLORIDE 3 MILLILITER(S): 9 INJECTION INTRAMUSCULAR; INTRAVENOUS; SUBCUTANEOUS at 13:22

## 2018-01-31 RX ADMIN — ONDANSETRON 4 MILLIGRAM(S): 8 TABLET, FILM COATED ORAL at 17:21

## 2018-01-31 RX ADMIN — SIMETHICONE 80 MILLIGRAM(S): 80 TABLET, CHEWABLE ORAL at 15:49

## 2018-01-31 RX ADMIN — Medication 12.5 MILLIGRAM(S): at 05:49

## 2018-01-31 RX ADMIN — MORPHINE SULFATE 2 MILLIGRAM(S): 50 CAPSULE, EXTENDED RELEASE ORAL at 16:52

## 2018-01-31 RX ADMIN — ONDANSETRON 4 MILLIGRAM(S): 8 TABLET, FILM COATED ORAL at 18:20

## 2018-01-31 RX ADMIN — MORPHINE SULFATE 2 MILLIGRAM(S): 50 CAPSULE, EXTENDED RELEASE ORAL at 17:31

## 2018-01-31 RX ADMIN — SODIUM CHLORIDE 3 MILLILITER(S): 9 INJECTION INTRAMUSCULAR; INTRAVENOUS; SUBCUTANEOUS at 22:18

## 2018-01-31 RX ADMIN — AMLODIPINE BESYLATE 5 MILLIGRAM(S): 2.5 TABLET ORAL at 05:49

## 2018-01-31 RX ADMIN — MORPHINE SULFATE 2 MILLIGRAM(S): 50 CAPSULE, EXTENDED RELEASE ORAL at 16:41

## 2018-01-31 RX ADMIN — ONDANSETRON 4 MILLIGRAM(S): 8 TABLET, FILM COATED ORAL at 00:49

## 2018-01-31 RX ADMIN — SODIUM CHLORIDE 3 MILLILITER(S): 9 INJECTION INTRAMUSCULAR; INTRAVENOUS; SUBCUTANEOUS at 05:51

## 2018-01-31 RX ADMIN — ONDANSETRON 4 MILLIGRAM(S): 8 TABLET, FILM COATED ORAL at 22:16

## 2018-01-31 RX ADMIN — SODIUM CHLORIDE 75 MILLILITER(S): 9 INJECTION, SOLUTION INTRAVENOUS at 18:20

## 2018-01-31 RX ADMIN — Medication 2.5 MILLIGRAM(S): at 05:49

## 2018-01-31 RX ADMIN — PANTOPRAZOLE SODIUM 40 MILLIGRAM(S): 20 TABLET, DELAYED RELEASE ORAL at 15:49

## 2018-01-31 RX ADMIN — Medication 30 MILLILITER(S): at 15:52

## 2018-01-31 RX ADMIN — SODIUM CHLORIDE 75 MILLILITER(S): 9 INJECTION, SOLUTION INTRAVENOUS at 20:53

## 2018-01-31 RX ADMIN — MORPHINE SULFATE 2 MILLIGRAM(S): 50 CAPSULE, EXTENDED RELEASE ORAL at 17:21

## 2018-01-31 RX ADMIN — Medication 5 MILLIGRAM(S): at 12:16

## 2018-01-31 NOTE — CONSULT NOTE ADULT - ASSESSMENT
Patient is a 77y old  Female who presents with a chief complaint of nausea/Abd pain and fever.    Abd pain:  GI eval noted.  CT A/P: No acute abdomen.  NPO  IVF  PPI  Reglan.    HTN:  Metoprolol/Norvasc.

## 2018-01-31 NOTE — CONSULT NOTE ADULT - SUBJECTIVE AND OBJECTIVE BOX
Chief Complaint:  Patient is a 77y old  Female who presents with a chief complaint of epigastric pain   HTN (hypertension)  No pertinent past medical history  History of laparotomy  No significant past surgical history     HPI:  77 year old female with HTN who presented to [facility] ED 18 c/o nausea and abdominal pain with subjective fevers and chills x3days, CT abd/pelvis No bowel obstruction. No wall thickening or pericolonic inflammatory change. Mild intrahepatic biliary duct dilatation of uncertain etiology. No gallstones or CBD dilatation. 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 Patient was subsequently discharged to home, subsequently re-presented to [facility] ED  c/o persistent symptoms with increased intensity. R/I for MI with first troponin 1.7 with EKG with mild SNOW. Patient given NS IVG bolus, Zofran and Tylenol, as well as lorazepam for abd muscle relaxation, with improvement in symptoms along with ASA. Patient urgently transferred to [facility] with an urgent cardiac catheterization on arrival via RRA access revealing EF 40%, LVEDP 15, normal coronary arteries and to be started on ACE-I and BB as per Dr VASILE Red and to be admitted to telemetry for further management. The patient is still with c/o epigastric pain with increased nausea and decreased po intake 2/2 fear of nausea worsening. She reports a bloating sensation as well She reports having a BM on  that was brown in color. Denies any melena, brbpr, hematemesis, dysphagia, odynophagia or weight loss changes. Pt reports colonoscopy 4-5 years ago that was "normal". Denied EGD.    No Known Allergies      amLODIPine   Tablet 5 milliGRAM(s) Oral daily  aspirin enteric coated 81 milliGRAM(s) Oral daily  enalapril 2.5 milliGRAM(s) Oral daily  metoclopramide 5 milliGRAM(s) Oral every 6 hours  metoprolol     tartrate 12.5 milliGRAM(s) Oral two times a day  pantoprazole    Tablet 40 milliGRAM(s) Oral two times a day before meals  sodium chloride 0.9% lock flush 3 milliLiter(s) IV Push every 8 hours  sucralfate 1 Gram(s) Oral two times a day        FAMILY HISTORY:  No pertinent family history in first degree relatives        Review of Systems:    General:  No wt loss, fevers, chills, night sweats, fatigue  Eyes:  Good vision, no reported pain  ENT:  No sore throat, pain, runny nose, dysphagia  CV:  No pain, palpitations, no lightheadedness  Resp:  No dyspnea, cough, tachypnea, wheezing  GI: as above  :  No pain, bleeding, incontinence, nocturia  Muscle:  No pain, weakness  Neuro:  No weakness, tingling, memory problems  Psych:  No fatigue, insomnia, mood problems, depression  Endocrine:  No polyuria, polydypsia, cold/heat intolerance  Heme:  No petechiae, ecchymosis, easy bruisability  Skin:  No rash, tattoos, scars, edema    Relevant Family History:   n/c    Relevant Social History: n/c      Physical Exam:    Vital Signs:  Vital Signs Last 24 Hrs  T(C): 37.1 (2018 05:48), Max: 37.1 (2018 05:48)  T(F): 98.7 (2018 05:48), Max: 98.7 (2018 05:48)  HR: 80 (2018 05:48) (80 - 84)  BP: 145/83 (2018 05:48) (103/58 - 145/83)  BP(mean): --  RR: 17 (2018 05:48) (17 - 17)  SpO2: 98% (2018 05:48) (97% - 98%)  Daily Height in cm: 152.4 (2018 20:08)    Daily Weight in k (2018 20:08)    General:  Appears stated age, well-groomed, nad  HEENT:  NC/AT,  conjunctivae clear and pink, no thyromegaly, nodules, adenopathy, no JVD  Chest:  Full & symmetric excursion, no increased effort, breath sounds clear  Cardiovascular:  Regular rhythm, S1, S2, no murmur/rub/S3/S4, no abdominal bruit, no edema  Abdomen:  Soft, +epigastric ttp, normoactive bowel sounds,  no masses ,no hepatosplenomeagaly, no signs of chronic liver disease  Extremities:  no cyanosis,clubbing or edema  Skin:  No rash/erythema/ecchymoses/petechiae/wounds/abscess/warm/dry  Neuro/Psych:  A&Ox3 , no asterixis, no tremor, no encephalopathy    Laboratory:                            15.2   11.98 )-----------( 298      ( 2018 10:20 )             46.0         139  |  100  |  26<H>  ----------------------------<  103<H>  3.9   |  26  |  1.50<H>    Ca    9.0      2018 10:20  Mg     2.5         TPro  8.8<H>  /  Alb  4.2  /  TBili  0.8  /  DBili  x   /  AST  60<H>  /  ALT  44  /  AlkPhos  59      LIVER FUNCTIONS - ( 2018 10:38 )  Alb: 4.2 g/dL / Pro: 8.8 g/dL / ALK PHOS: 59 U/L / ALT: 44 U/L DA / AST: 60 U/L / GGT: x             Urinalysis Basic - ( 2018 10:12 )    Color: Yellow / Appearance: Clear / S.015 / pH: x  Gluc: x / Ketone: Trace  / Bili: Negative / Urobili: Negative   Blood: x / Protein: 30 mg/dL / Nitrite: Negative   Leuk Esterase: Small / RBC: 2-5 /HPF / WBC 0-2 /HPF   Sq Epi: x / Non Sq Epi: Few /HPF / Bacteria: Trace /HPF        Imaging:

## 2018-01-31 NOTE — CONSULT NOTE ADULT - ATTENDING COMMENTS
patient with abdominal pain, dilated bile duct in ct  would check MRCP r/o stone  if mrcp negative may need  upper gastrointestinal endoscopy however patient seems reluctant to proceed

## 2018-01-31 NOTE — CONSULT NOTE ADULT - PROBLEM SELECTOR RECOMMENDATION 9
- ddx GERD vs less likely cbd stone obstructive symptoms   - CT Abd noted w/Mild intrahepatic biliary duct dilatation of uncertain etiology. 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

## 2018-01-31 NOTE — CONSULT NOTE ADULT - SUBJECTIVE AND OBJECTIVE BOX
Patient is a 77y year old female with PMHx of HTN presented to OSH with abdominal pain and was transferred for an NSTEMI to [facility]. (Coronary cath showed CATHETERIZATION: Non Obstructive CAD,Inferior Hypokinesis EF 40%) complains of 3 days of severe 10/10 abdominal pain. With morphine the pain is currently 5/10. She also complains of some nausea and an episode of vomiting. She reports that she last had a BM 2 days ago. It was not bloody and there was no diarrhea. She last passed gas yesterday or today, she's not sure when. She states that eating made the pain significantly worse.       HPI:  77 year old female with HTN who presented to [facility] ED 18 c/o nausea and abdominal pain with subjective fevers and chills x3days, CT abd/pelvis No bowel obstruction. No wall thickening or pericolonic inflammatory change. Mild intrahepatic biliary duct dilatation of uncertain etiology. No gallstones or CBD dilatation. 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   Patient was subsequently discharged to home, subsequently re-presented to [facility] ED  c/o persistent symptoms with increased intensity. R/I for MI with first troponin 1.7 with EKG with mild SNOW. Patient given NS IVG bolus, Zofran and Tylenol, as well as lorazepam for abd muscle relaxation, with improvement in symptoms along with ASA. Patient urgently transferred to [facility] with an urgent cardiac catheterization on arrival via RRA access revealing EF 40%, LVEDP 15, normal coronary arteries and to be started on ACE-I and BB as per Dr VASILE Red and to be admitted to telemetry for further management.   Patient currently asymptomatic, chest pain and abdominal pain free and afebrile. (2018 15:15)      PMH  HTN (hypertension)  No pertinent past medical history    PSH  History of laparotomy, she states performed by GYN- nothing was found during the laparotomy      MEDS    Allergies    No Known Allergies    Intolerances        Social: patient does not smoke or drink alcohol. She is a retired computer lab teacher.        Physical Exam  T(C): 37.2 (18 @ 22:00), Max: 37.2 (18 @ 14:52)  HR: 67 (18 @ 22:00) (67 - 80)  BP: 112/58 (18 @ 22:00) (112/58 - 147/72)  RR: 17 (18 @ 22:00) (17 - 17)  SpO2: 98% (18 @ 22:00) (98% - 99%)  Wt(kg): --  Tmax: T(C): , Max: 37.2 (18 @ 14:52)  Wt(kg): --    Gen: NAD  HEENT: normocephalic, atraumatic, no scleral icterus  CV: S1, S2, RRR  Pulm: CTA B/L  Abd: Soft, ND, NTP, no rebound, no guarding, no palpable organomegaly/masses, well healed lower laparotomy scar  Ext: warm, no edema, palp dp/pt      Labs:                        17.0   13.38 )-----------( 237      ( 2018 17:45 )             49.2         137  |  94<L>  |  30<H>  ----------------------------<  119<H>  4.1   |  27  |  1.41<H>    Ca    9.7      2018 17:45  Mg     2.5         TPro  8.8<H>  /  Alb  4.2  /  TBili  0.8  /  DBili  x   /  AST  60<H>  /  ALT  44  /  AlkPhos  59      PT/INR - ( 2018 20:55 )   PT: 11.3 SEC;   INR: 0.98          PTT - ( 2018 20:55 )  PTT:29.7 SEC  Urinalysis Basic - ( 2018 10:12 )    Color: Yellow / Appearance: Clear / S.015 / pH: x  Gluc: x / Ketone: Trace  / Bili: Negative / Urobili: Negative   Blood: x / Protein: 30 mg/dL / Nitrite: Negative   Leuk Esterase: Small / RBC: 2-5 /HPF / WBC 0-2 /HPF   Sq Epi: x / Non Sq Epi: Few /HPF / Bacteria: Trace /HPF      ABG - ( 2018 20:53 )  pH: 7.48  /  pCO2: 41    /  pO2: 125   / HCO3: 30    / Base Excess: 6.3   /  SaO2: 98.8          Imaging  CT   IMPRESSION:   No bowel obstruction. No wall thickening or pericolonic inflammatory   change.    Mild intrahepatic biliary duct dilatation of uncertain etiology. No   gallstones or CBD dilatation.    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

## 2018-01-31 NOTE — CONSULT NOTE ADULT - SUBJECTIVE AND OBJECTIVE BOX
Patient is a 77y old  Female who presents with a chief complaint of nausea/Abd pain and fever.    HPI:  77 year old female with HTN who presented to [facility] ED 18 c/o nausea and abdominal pain with subjective fevers and chills x3days, CT abd/pelvis No bowel obstruction. No wall thickening or pericolonic inflammatory change. Mild intrahepatic biliary duct dilatation of uncertain etiology. No gallstones or CBD dilatation. 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   Patient was subsequently discharged to home, subsequently re-presented to [facility] ED  c/o persistent symptoms with increased intensity. R/I for MI with first troponin 1.7 with EKG with mild SNOW. Patient given NS IVG bolus, Zofran and Tylenol, as well as lorazepam for abd muscle relaxation, with improvement in symptoms along with ASA. Patient urgently transferred to [facility] with an urgent cardiac catheterization on arrival via RRA access revealing EF 40%, LVEDP 15, normal coronary arteries and to be started on ACE-I and BB as per Dr VASILE Red and to be admitted to telemetry for further management.   Patient currently asymptomatic, chest pain and abdominal pain free and afebrile. (2018 15:15)      PAST MEDICAL & SURGICAL HISTORY:  HTN (hypertension)  History of laparotomy      Review of Systems:     EYES: No eye pain, visual disturbances, or discharge  ENMT:  No difficulty hearing, tinnitus, vertigo; No sinus or throat pain  NECK: No pain or stiffness  RESPIRATORY: No cough, wheezing, chills or hemoptysis; No shortness of breath  CARDIOVASCULAR: No chest pain, palpitations, dizziness, or leg swelling  GASTROINTESTINAL: Abd pain  NEUROLOGICAL: No headaches, memory loss, loss of strength, numbness, or tremors  SKIN: No itching, burning, rashes, or lesions   MUSCULOSKELETAL: No joint pain or swelling; No muscle, back, or extremity pain  PSYCHIATRIC: No depression, anxiety, mood swings, or difficulty sleeping      Allergies    No Known Allergies    Intolerances        Social History:     FAMILY HISTORY:  No pertinent family history in first degree relatives      MEDICATIONS  (STANDING):  amLODIPine   Tablet 5 milliGRAM(s) Oral daily  aspirin enteric coated 81 milliGRAM(s) Oral daily  dextrose 5% + sodium chloride 0.45%. 1000 milliLiter(s) (75 mL/Hr) IV Continuous <Continuous>  enalapril 2.5 milliGRAM(s) Oral daily  metoclopramide 5 milliGRAM(s) Oral every 6 hours  metoprolol     tartrate 12.5 milliGRAM(s) Oral two times a day  pantoprazole    Tablet 40 milliGRAM(s) Oral two times a day before meals  simethicone 80 milliGRAM(s) Chew two times a day  sodium chloride 0.9% lock flush 3 milliLiter(s) IV Push every 8 hours  sucralfate 1 Gram(s) Oral two times a day    MEDICATIONS  (PRN):  ondansetron Injectable 4 milliGRAM(s) IV Push every 6 hours PRN Nausea and/or Vomiting      CAPILLARY BLOOD GLUCOSE        I&O's Summary      PHYSICAL EXAM:  GENERAL: NAD, well-developed  HEAD:  Atraumatic, Normocephalic  NECK: Supple, No JVD  CHEST/LUNG: Clear to auscultation bilaterally; No wheezing.  HEART: Regular rate and rhythm; No murmurs, rubs, or gallops  ABDOMEN: Soft, Nontender, Nondistended; Bowel sounds present  EXTREMITIES:  2+ Peripheral Pulses, No clubbing, cyanosis, or edema  PSYCH: AAOx3  NEUROLOGY: non-focal  SKIN: No rashes or lesions    LABS:                        17.0   13.38 )-----------( 237      ( 2018 17:45 )             49.2         137  |  94<L>  |  30<H>  ----------------------------<  119<H>  4.1   |  27  |  1.41<H>    Ca    9.7      2018 17:45  Mg     2.5         TPro  8.8<H>  /  Alb  4.2  /  TBili  0.8  /  DBili  x   /  AST  60<H>  /  ALT  44  /  AlkPhos  59      PT/INR - ( 2018 20:55 )   PT: 11.3 SEC;   INR: 0.98          PTT - ( 2018 20:55 )  PTT:29.7 SEC  CARDIAC MARKERS ( 2018 10:38 )  1.730 ng/mL / x     / x     / x     / x          Urinalysis Basic - ( 2018 10:12 )    Color: Yellow / Appearance: Clear / S.015 / pH: x  Gluc: x / Ketone: Trace  / Bili: Negative / Urobili: Negative   Blood: x / Protein: 30 mg/dL / Nitrite: Negative   Leuk Esterase: Small / RBC: 2-5 /HPF / WBC 0-2 /HPF   Sq Epi: x / Non Sq Epi: Few /HPF / Bacteria: Trace /HPF      CAPILLARY BLOOD GLUCOSE                    RADIOLOGY & ADDITIONAL TESTS:    Imaging Personally Reviewed:    Consultant(s) Notes Reviewed:      Care Discussed with Consultants/Other Providers:    Thanks for consult. Will follow.

## 2018-01-31 NOTE — CONSULT NOTE ADULT - PROBLEM SELECTOR RECOMMENDATION 2
- reglan q6h x 5 days - reglan q6h x 5 days  - outpatient fu in our office on February 22 @ 1:00pm; pt will need a referral from her PCP

## 2018-01-31 NOTE — CONSULT NOTE ADULT - ASSESSMENT
77 year old woman presents with NSTEMI and abdominal pain. Vital signs within normal limits, abdomen nontender, CT shows no findings of acute surgical abdominal pathology, lactate 1.3    No acute surgical intervention at this time  f/u GI    Please call back with any questions or concerns    - NUNO Rivera, PGY2  # [phone]

## 2018-02-01 DIAGNOSIS — K59.01 SLOW TRANSIT CONSTIPATION: ICD-10-CM

## 2018-02-01 LAB
BUN SERPL-MCNC: 29 MG/DL — HIGH (ref 7–23)
CALCIUM SERPL-MCNC: 8.9 MG/DL — SIGNIFICANT CHANGE UP (ref 8.4–10.5)
CHLORIDE SERPL-SCNC: 97 MMOL/L — LOW (ref 98–107)
CO2 SERPL-SCNC: 26 MMOL/L — SIGNIFICANT CHANGE UP (ref 22–31)
CREAT SERPL-MCNC: 0.98 MG/DL — SIGNIFICANT CHANGE UP (ref 0.5–1.3)
GLUCOSE SERPL-MCNC: 103 MG/DL — HIGH (ref 70–99)
HCT VFR BLD CALC: 43.4 % — SIGNIFICANT CHANGE UP (ref 34.5–45)
HGB BLD-MCNC: 15 G/DL — SIGNIFICANT CHANGE UP (ref 11.5–15.5)
MAGNESIUM SERPL-MCNC: 2.5 MG/DL — SIGNIFICANT CHANGE UP (ref 1.6–2.6)
MCHC RBC-ENTMCNC: 30.5 PG — SIGNIFICANT CHANGE UP (ref 27–34)
MCHC RBC-ENTMCNC: 34.6 % — SIGNIFICANT CHANGE UP (ref 32–36)
MCV RBC AUTO: 88.4 FL — SIGNIFICANT CHANGE UP (ref 80–100)
NRBC # FLD: 0 — SIGNIFICANT CHANGE UP
PLATELET # BLD AUTO: 273 K/UL — SIGNIFICANT CHANGE UP (ref 150–400)
PMV BLD: 9.8 FL — SIGNIFICANT CHANGE UP (ref 7–13)
POTASSIUM SERPL-MCNC: 3.6 MMOL/L — SIGNIFICANT CHANGE UP (ref 3.5–5.3)
POTASSIUM SERPL-SCNC: 3.6 MMOL/L — SIGNIFICANT CHANGE UP (ref 3.5–5.3)
RBC # BLD: 4.91 M/UL — SIGNIFICANT CHANGE UP (ref 3.8–5.2)
RBC # FLD: 13.6 % — SIGNIFICANT CHANGE UP (ref 10.3–14.5)
SODIUM SERPL-SCNC: 135 MMOL/L — SIGNIFICANT CHANGE UP (ref 135–145)
WBC # BLD: 8.39 K/UL — SIGNIFICANT CHANGE UP (ref 3.8–10.5)
WBC # FLD AUTO: 8.39 K/UL — SIGNIFICANT CHANGE UP (ref 3.8–10.5)

## 2018-02-01 PROCEDURE — 74183 MRI ABD W/O CNTR FLWD CNTR: CPT | Mod: 26

## 2018-02-01 RX ORDER — ACETAMINOPHEN 500 MG
1000 TABLET ORAL ONCE
Qty: 0 | Refills: 0 | Status: COMPLETED | OUTPATIENT
Start: 2018-02-01 | End: 2018-02-01

## 2018-02-01 RX ORDER — POTASSIUM CHLORIDE 20 MEQ
10 PACKET (EA) ORAL ONCE
Qty: 0 | Refills: 0 | Status: DISCONTINUED | OUTPATIENT
Start: 2018-02-01 | End: 2018-02-01

## 2018-02-01 RX ORDER — POLYETHYLENE GLYCOL 3350 17 G/17G
17 POWDER, FOR SOLUTION ORAL
Qty: 0 | Refills: 0 | Status: DISCONTINUED | OUTPATIENT
Start: 2018-02-01 | End: 2018-02-08

## 2018-02-01 RX ORDER — LANOLIN ALCOHOL/MO/W.PET/CERES
3 CREAM (GRAM) TOPICAL ONCE
Qty: 0 | Refills: 0 | Status: COMPLETED | OUTPATIENT
Start: 2018-02-01 | End: 2018-02-01

## 2018-02-01 RX ADMIN — Medication 5 MILLIGRAM(S): at 18:58

## 2018-02-01 RX ADMIN — Medication 1 GRAM(S): at 18:57

## 2018-02-01 RX ADMIN — SODIUM CHLORIDE 75 MILLILITER(S): 9 INJECTION, SOLUTION INTRAVENOUS at 01:58

## 2018-02-01 RX ADMIN — SODIUM CHLORIDE 3 MILLILITER(S): 9 INJECTION INTRAMUSCULAR; INTRAVENOUS; SUBCUTANEOUS at 13:19

## 2018-02-01 RX ADMIN — Medication 5 MILLIGRAM(S): at 12:41

## 2018-02-01 RX ADMIN — PANTOPRAZOLE SODIUM 40 MILLIGRAM(S): 20 TABLET, DELAYED RELEASE ORAL at 18:58

## 2018-02-01 RX ADMIN — Medication 1000 MILLIGRAM(S): at 00:15

## 2018-02-01 RX ADMIN — SODIUM CHLORIDE 3 MILLILITER(S): 9 INJECTION INTRAMUSCULAR; INTRAVENOUS; SUBCUTANEOUS at 21:41

## 2018-02-01 RX ADMIN — SODIUM CHLORIDE 75 MILLILITER(S): 9 INJECTION, SOLUTION INTRAVENOUS at 22:59

## 2018-02-01 RX ADMIN — Medication 12.5 MILLIGRAM(S): at 18:58

## 2018-02-01 RX ADMIN — Medication 5 MILLIGRAM(S): at 23:00

## 2018-02-01 RX ADMIN — SODIUM CHLORIDE 3 MILLILITER(S): 9 INJECTION INTRAMUSCULAR; INTRAVENOUS; SUBCUTANEOUS at 06:35

## 2018-02-01 RX ADMIN — SIMETHICONE 80 MILLIGRAM(S): 80 TABLET, CHEWABLE ORAL at 18:57

## 2018-02-01 RX ADMIN — Medication 10 MILLIGRAM(S): at 18:58

## 2018-02-01 RX ADMIN — POLYETHYLENE GLYCOL 3350 17 GRAM(S): 17 POWDER, FOR SOLUTION ORAL at 12:40

## 2018-02-01 RX ADMIN — Medication 400 MILLIGRAM(S): at 01:34

## 2018-02-01 RX ADMIN — Medication 3 MILLIGRAM(S): at 22:59

## 2018-02-01 RX ADMIN — Medication 81 MILLIGRAM(S): at 12:41

## 2018-02-01 NOTE — PROGRESS NOTE ADULT - PROBLEM SELECTOR PLAN 1
- CT Abd noted w/Mild intrahepatic biliary duct dilatation of uncertain etiology. 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

## 2018-02-01 NOTE — PROGRESS NOTE ADULT - SUBJECTIVE AND OBJECTIVE BOX
INTERVAL HPI/OVERNIGHT EVENTS:    pt reporting still with epigastric pain/bloating/nausea  no bm     MEDICATIONS  (STANDING):  amLODIPine   Tablet 5 milliGRAM(s) Oral daily  aspirin enteric coated 81 milliGRAM(s) Oral daily  dextrose 5% + sodium chloride 0.45%. 1000 milliLiter(s) (75 mL/Hr) IV Continuous <Continuous>  enalapril 2.5 milliGRAM(s) Oral daily  metoclopramide 5 milliGRAM(s) Oral every 6 hours  metoprolol     tartrate 12.5 milliGRAM(s) Oral two times a day  pantoprazole    Tablet 40 milliGRAM(s) Oral two times a day before meals  polyethylene glycol 3350 17 Gram(s) Oral two times a day  simethicone 80 milliGRAM(s) Chew two times a day  sodium chloride 0.9% lock flush 3 milliLiter(s) IV Push every 8 hours  sucralfate 1 Gram(s) Oral two times a day    MEDICATIONS  (PRN):  ondansetron Injectable 4 milliGRAM(s) IV Push every 6 hours PRN Nausea and/or Vomiting      Allergies    No Known Allergies    Intolerances        Review of Systems:    General:  No wt loss, fevers, chills, night sweats, fatigue   Eyes:  Good vision, no reported pain  ENT:  No sore throat, pain, runny nose, dysphagia  CV:  No pain, palpitations, hypo/hypertension  Resp:  No dyspnea, cough, tachypnea, wheezing  GI:  +pain, +nausea, no vomiting, No diarrhea, No constipation, No weight loss, No fever, No pruritis, No rectal bleeding, No melena, No dysphagia  :  No pain, bleeding, incontinence, nocturia  Muscle:  No pain, weakness  Neuro:  No weakness, tingling, memory problems  Psych:  No fatigue, insomnia, mood problems, depression  Endocrine:  No polyuria, polydypsia, cold/heat intolerance  Heme:  No petechiae, ecchymosis, easy bruisability  Skin:  No rash, tattoos, scars, edema      Vital Signs Last 24 Hrs  T(C): 36.9 (01 Feb 2018 06:32), Max: 37.2 (31 Jan 2018 14:52)  T(F): 98.4 (01 Feb 2018 06:32), Max: 98.9 (31 Jan 2018 14:52)  HR: 76 (01 Feb 2018 06:32) (65 - 77)  BP: 99/54 (01 Feb 2018 06:32) (99/54 - 147/72)  BP(mean): --  RR: 18 (01 Feb 2018 06:32) (17 - 18)  SpO2: 98% (01 Feb 2018 06:32) (98% - 99%)    PHYSICAL EXAM:    Constitutional: NAD  HEENT: EOMI, throat clear  Neck: No LAD, supple  Respiratory: CTA and P  Cardiovascular: S1 and S2, RRR, no M  Gastrointestinal: BS+, soft, NT/ND, neg HSM,  Extremities: No peripheral edema, neg clubbing, cyanosis  Vascular: 2+ peripheral pulses  Neurological: A/O x 3, no focal deficits  Psychiatric: Normal mood, normal affect  Skin: No rashes      LABS:                        15.0   8.39  )-----------( 273      ( 01 Feb 2018 06:10 )             43.4     02-01    135  |  97<L>  |  29<H>  ----------------------------<  103<H>  3.6   |  26  |  0.98    Ca    8.9      01 Feb 2018 06:10  Mg     2.5     02-01      PT/INR - ( 31 Jan 2018 20:55 )   PT: 11.3 SEC;   INR: 0.98          PTT - ( 31 Jan 2018 20:55 )  PTT:29.7 SEC      RADIOLOGY & ADDITIONAL TESTS:

## 2018-02-01 NOTE — PROGRESS NOTE ADULT - SUBJECTIVE AND OBJECTIVE BOX
Patient is a 77y old  Female who presents with a chief complaint of     SUBJECTIVE / OVERNIGHT EVENTS:   Feels better.  Denies CP/SOB/Palpitation/HA.    MEDICATIONS  (STANDING):  amLODIPine   Tablet 5 milliGRAM(s) Oral daily  aspirin enteric coated 81 milliGRAM(s) Oral daily  dextrose 5% + sodium chloride 0.45%. 1000 milliLiter(s) (75 mL/Hr) IV Continuous <Continuous>  enalapril 2.5 milliGRAM(s) Oral daily  metoclopramide 5 milliGRAM(s) Oral every 6 hours  metoprolol     tartrate 12.5 milliGRAM(s) Oral two times a day  pantoprazole    Tablet 40 milliGRAM(s) Oral two times a day before meals  polyethylene glycol 3350 17 Gram(s) Oral two times a day  simethicone 80 milliGRAM(s) Chew two times a day  sodium chloride 0.9% lock flush 3 milliLiter(s) IV Push every 8 hours  sucralfate 1 Gram(s) Oral two times a day    MEDICATIONS  (PRN):  bisacodyl Suppository 10 milliGRAM(s) Rectal daily PRN Constipation  ondansetron Injectable 4 milliGRAM(s) IV Push every 6 hours PRN Nausea and/or Vomiting        CAPILLARY BLOOD GLUCOSE        I&O's Summary      PHYSICAL EXAM:  GENERAL: NAD, well-developed  HEAD:  Atraumatic, Normocephalic  NECK: Supple, No JVD  CHEST/LUNG: Clear to auscultation bilaterally; No wheezing.  HEART: Regular rate and rhythm; No murmurs, rubs, or gallops  ABDOMEN: Soft, Nontender, Nondistended; Bowel sounds present  EXTREMITIES:   No clubbing, cyanosis, or edema  NEUROLOGY: AAO X 3  SKIN: No rashes    LABS:                        15.0   8.39  )-----------( 273      ( 01 Feb 2018 06:10 )             43.4     02-01    135  |  97<L>  |  29<H>  ----------------------------<  103<H>  3.6   |  26  |  0.98    Ca    8.9      01 Feb 2018 06:10  Mg     2.5     02-01      PT/INR - ( 31 Jan 2018 20:55 )   PT: 11.3 SEC;   INR: 0.98          PTT - ( 31 Jan 2018 20:55 )  PTT:29.7 SEC        CAPILLARY BLOOD GLUCOSE                    RADIOLOGY & ADDITIONAL TESTS:    Imaging Personally Reviewed:    Consultant(s) Notes Reviewed:      Care Discussed with Consultants/Other Providers:

## 2018-02-01 NOTE — PROGRESS NOTE ADULT - ASSESSMENT
Patient is a 77y old  Female who presents with a chief complaint of nausea/Abd pain and fever.    Abd pain:  GI f/up noted.  CT A/P: No acute abdomen.  EGD tomorrow.  IVF  PPI  Reglan.    HTN:  Metoprolol/Norvasc.

## 2018-02-02 ENCOUNTER — RESULT REVIEW (OUTPATIENT)
Age: 78
End: 2018-02-02

## 2018-02-02 LAB
BUN SERPL-MCNC: 20 MG/DL — SIGNIFICANT CHANGE UP (ref 7–23)
CALCIUM SERPL-MCNC: 8.6 MG/DL — SIGNIFICANT CHANGE UP (ref 8.4–10.5)
CHLORIDE SERPL-SCNC: 100 MMOL/L — SIGNIFICANT CHANGE UP (ref 98–107)
CO2 SERPL-SCNC: 26 MMOL/L — SIGNIFICANT CHANGE UP (ref 22–31)
CREAT SERPL-MCNC: 0.69 MG/DL — SIGNIFICANT CHANGE UP (ref 0.5–1.3)
GLUCOSE SERPL-MCNC: 89 MG/DL — SIGNIFICANT CHANGE UP (ref 70–99)
HCT VFR BLD CALC: 37.1 % — SIGNIFICANT CHANGE UP (ref 34.5–45)
HGB BLD-MCNC: 12.3 G/DL — SIGNIFICANT CHANGE UP (ref 11.5–15.5)
MAGNESIUM SERPL-MCNC: 2.4 MG/DL — SIGNIFICANT CHANGE UP (ref 1.6–2.6)
MCHC RBC-ENTMCNC: 29.6 PG — SIGNIFICANT CHANGE UP (ref 27–34)
MCHC RBC-ENTMCNC: 33.2 % — SIGNIFICANT CHANGE UP (ref 32–36)
MCV RBC AUTO: 89.2 FL — SIGNIFICANT CHANGE UP (ref 80–100)
NRBC # FLD: 0 — SIGNIFICANT CHANGE UP
PLATELET # BLD AUTO: 253 K/UL — SIGNIFICANT CHANGE UP (ref 150–400)
PMV BLD: 9.6 FL — SIGNIFICANT CHANGE UP (ref 7–13)
POTASSIUM SERPL-MCNC: 3.4 MMOL/L — LOW (ref 3.5–5.3)
POTASSIUM SERPL-SCNC: 3.4 MMOL/L — LOW (ref 3.5–5.3)
RBC # BLD: 4.16 M/UL — SIGNIFICANT CHANGE UP (ref 3.8–5.2)
RBC # FLD: 13.4 % — SIGNIFICANT CHANGE UP (ref 10.3–14.5)
SODIUM SERPL-SCNC: 137 MMOL/L — SIGNIFICANT CHANGE UP (ref 135–145)
WBC # BLD: 7.66 K/UL — SIGNIFICANT CHANGE UP (ref 3.8–10.5)
WBC # FLD AUTO: 7.66 K/UL — SIGNIFICANT CHANGE UP (ref 3.8–10.5)

## 2018-02-02 PROCEDURE — 93306 TTE W/DOPPLER COMPLETE: CPT | Mod: 26

## 2018-02-02 PROCEDURE — 88312 SPECIAL STAINS GROUP 1: CPT | Mod: 26

## 2018-02-02 PROCEDURE — 88305 TISSUE EXAM BY PATHOLOGIST: CPT | Mod: 26

## 2018-02-02 PROCEDURE — 74018 RADEX ABDOMEN 1 VIEW: CPT | Mod: 26,76

## 2018-02-02 RX ORDER — ACETAMINOPHEN 500 MG
650 TABLET ORAL EVERY 6 HOURS
Qty: 0 | Refills: 0 | Status: DISCONTINUED | OUTPATIENT
Start: 2018-02-02 | End: 2018-02-08

## 2018-02-02 RX ORDER — MORPHINE SULFATE 50 MG/1
1 CAPSULE, EXTENDED RELEASE ORAL ONCE
Qty: 0 | Refills: 0 | Status: DISCONTINUED | OUTPATIENT
Start: 2018-02-02 | End: 2018-02-02

## 2018-02-02 RX ORDER — ACETAMINOPHEN 500 MG
1000 TABLET ORAL ONCE
Qty: 0 | Refills: 0 | Status: DISCONTINUED | OUTPATIENT
Start: 2018-02-02 | End: 2018-02-02

## 2018-02-02 RX ORDER — METOCLOPRAMIDE HCL 10 MG
5 TABLET ORAL ONCE
Qty: 0 | Refills: 0 | Status: COMPLETED | OUTPATIENT
Start: 2018-02-02 | End: 2018-02-02

## 2018-02-02 RX ADMIN — MORPHINE SULFATE 1 MILLIGRAM(S): 50 CAPSULE, EXTENDED RELEASE ORAL at 21:02

## 2018-02-02 RX ADMIN — MORPHINE SULFATE 1 MILLIGRAM(S): 50 CAPSULE, EXTENDED RELEASE ORAL at 20:42

## 2018-02-02 RX ADMIN — SODIUM CHLORIDE 75 MILLILITER(S): 9 INJECTION, SOLUTION INTRAVENOUS at 05:54

## 2018-02-02 RX ADMIN — ONDANSETRON 4 MILLIGRAM(S): 8 TABLET, FILM COATED ORAL at 18:50

## 2018-02-02 RX ADMIN — SODIUM CHLORIDE 3 MILLILITER(S): 9 INJECTION INTRAMUSCULAR; INTRAVENOUS; SUBCUTANEOUS at 07:40

## 2018-02-02 RX ADMIN — SODIUM CHLORIDE 3 MILLILITER(S): 9 INJECTION INTRAMUSCULAR; INTRAVENOUS; SUBCUTANEOUS at 21:02

## 2018-02-02 RX ADMIN — Medication 650 MILLIGRAM(S): at 19:50

## 2018-02-02 RX ADMIN — SODIUM CHLORIDE 3 MILLILITER(S): 9 INJECTION INTRAMUSCULAR; INTRAVENOUS; SUBCUTANEOUS at 13:03

## 2018-02-02 RX ADMIN — PANTOPRAZOLE SODIUM 40 MILLIGRAM(S): 20 TABLET, DELAYED RELEASE ORAL at 18:25

## 2018-02-02 RX ADMIN — Medication 1 GRAM(S): at 18:24

## 2018-02-02 RX ADMIN — SODIUM CHLORIDE 75 MILLILITER(S): 9 INJECTION, SOLUTION INTRAVENOUS at 21:25

## 2018-02-02 RX ADMIN — Medication 650 MILLIGRAM(S): at 18:50

## 2018-02-02 RX ADMIN — Medication 5 MILLIGRAM(S): at 21:51

## 2018-02-02 RX ADMIN — Medication 10 MILLIGRAM(S): at 21:24

## 2018-02-02 RX ADMIN — SIMETHICONE 80 MILLIGRAM(S): 80 TABLET, CHEWABLE ORAL at 18:24

## 2018-02-02 RX ADMIN — Medication 5 MILLIGRAM(S): at 18:24

## 2018-02-02 RX ADMIN — Medication 12.5 MILLIGRAM(S): at 18:25

## 2018-02-02 NOTE — CONSULT NOTE ADULT - ASSESSMENT
77F PMHx HTN presenting w/ abdominal pain, transferred for NSTEMI to [facility] on 1/31/18, for chronic mesenteric ischemia.     -Pain likely 2/2 low flow state in setting of NSTEMI  -no acute Vascular Surgery intervention indicated at this time.      Discussed w/ Vascular Fellow, Dr. JAKUB Christina, who will evaluate & discuss w/ attending.    ALONDRA Moran MD (PGY2)    (Please page C team [phone] for questions/concerns.) 77F PMHx HTN presenting w/ abdominal pain, transferred for NSTEMI to [facility] on 1/31/18, for chronic mesenteric ischemia.     -Pain unlikely due to vascular disease. Most likely 2/2 low flow state in setting of NSTEMI  -no acute Vascular Surgery intervention indicated at this time.      Discussed w/ Vascular Fellow, Dr. JAKUB Christina, who will evaluate & discuss w/ attending.    ALONDRA Moran MD (PGY2)    (Please page C team [phone] for questions/concerns.)

## 2018-02-02 NOTE — PROGRESS NOTE ADULT - SUBJECTIVE AND OBJECTIVE BOX
Patient is a 77y old  Female who presents with a chief complaint of     SUBJECTIVE / OVERNIGHT EVENTS:   Feels better.  Denies CP/SOB/Palpitation/HA.    MEDICATIONS  (STANDING):  amLODIPine   Tablet 5 milliGRAM(s) Oral daily  aspirin enteric coated 81 milliGRAM(s) Oral daily  dextrose 5% + sodium chloride 0.45%. 1000 milliLiter(s) (75 mL/Hr) IV Continuous <Continuous>  enalapril 2.5 milliGRAM(s) Oral daily  LORazepam   Injectable 0.5 milliGRAM(s) IV Push once  metoclopramide 5 milliGRAM(s) Oral every 6 hours  metoprolol     tartrate 12.5 milliGRAM(s) Oral two times a day  pantoprazole    Tablet 40 milliGRAM(s) Oral two times a day before meals  polyethylene glycol 3350 17 Gram(s) Oral two times a day  simethicone 80 milliGRAM(s) Chew two times a day  sodium chloride 0.9% lock flush 3 milliLiter(s) IV Push every 8 hours  sucralfate 1 Gram(s) Oral two times a day    MEDICATIONS  (PRN):  acetaminophen   Tablet. 650 milliGRAM(s) Oral every 6 hours PRN Moderate Pain (4 - 6)  bisacodyl Suppository 10 milliGRAM(s) Rectal daily PRN Constipation  ondansetron Injectable 4 milliGRAM(s) IV Push every 6 hours PRN Nausea and/or Vomiting        CAPILLARY BLOOD GLUCOSE        I&O's Summary      PHYSICAL EXAM:  GENERAL: NAD, well-developed  HEAD:  Atraumatic, Normocephalic  NECK: Supple, No JVD  CHEST/LUNG: Clear to auscultation bilaterally; No wheezing.  HEART: Regular rate and rhythm; No murmurs, rubs, or gallops  ABDOMEN: Soft, Nontender, Nondistended; Bowel sounds present  EXTREMITIES:   No clubbing, cyanosis, or edema  NEUROLOGY: AAO X 3  SKIN: No rashes    LABS:                        12.3   7.66  )-----------( 253      ( 02 Feb 2018 06:00 )             37.1     02-02    137  |  100  |  20  ----------------------------<  89  3.4<L>   |  26  |  0.69    Ca    8.6      02 Feb 2018 06:00  Mg     2.4     02-02              CAPILLARY BLOOD GLUCOSE                    RADIOLOGY & ADDITIONAL TESTS:    Imaging Personally Reviewed:    Consultant(s) Notes Reviewed:      Care Discussed with Consultants/Other Providers:

## 2018-02-02 NOTE — PROGRESS NOTE ADULT - ASSESSMENT
Patient is a 77y old  Female who presents with a chief complaint of nausea/Abd pain and fever.    Abd pain:  GI f/up noted.  CT A/P: No acute abdomen.  EGD today  IVF  PPI  Reglan.    HTN:  Metoprolol/Norvasc.    Chest pain:  ECHO reviewed.

## 2018-02-02 NOTE — CHART NOTE - NSCHARTNOTEFT_GEN_A_CORE
case d/w dr pacheco  patient with normal cardiac cath and normal echo   no cardiac contraindications to proceed with egd

## 2018-02-02 NOTE — CONSULT NOTE ADULT - SUBJECTIVE AND OBJECTIVE BOX
Vascular Surgery Consult  C team Surgery [id number]    CC: 77y old Female admitted with a chief complaint of , now      77F PMHx HTN who originally presented to OSH with abdominal pain and was transferred for an NSTEMI to [facility] on 1/31/18. On presentation she was complaining of 3 days of severe 10/10 abdominal pain; partially palliated w/ morphine the pain is currently 5/10. She also complained of some nausea and an episode of vomiting. She reports that she last had a BM 2 days prior to presentation. It was not bloody and there was no diarrhea. She last passed flatus day prior to presentation. She states that eating made the pain significantly worse. She was admitted to Medicine/Cardiology. Since her transfer to [facility] her pain had improved. GI is planning EGD today. CT w/ IV contrast shows patent mesenteric vessels w/ some calcifications. Vascular Surgery is consulted to evaluate for chronic mesenteric ischemia.       HPI:  77 year old female with HTN who presented to [facility] ED 1/28/18 c/o nausea and abdominal pain with subjective fevers and chills x3days, CT abd/pelvis No bowel obstruction. No wall thickening or pericolonic inflammatory change. Mild intrahepatic biliary duct dilatation of uncertain etiology. No gallstones or CBD dilatation. 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     Patient was subsequently discharged to home, subsequently re-presented to [facility] ED 1/30 c/o persistent symptoms with increased intensity. R/I for MI with first troponin 1.7 with EKG with mild SNOW. Patient given NS IVG bolus, Zofran and Tylenol, as well as lorazepam for abd muscle relaxation, with improvement in symptoms along with ASA. Patient urgently transferred to [facility] with an urgent cardiac catheterization on arrival via RRA access revealing EF 40%, LVEDP 15, normal coronary arteries and to be started on ACE-I and BB as per Dr VASILE Red and to be admitted to telemetry for further management.   Patient currently asymptomatic, chest pain and abdominal pain free and afebrile. (30 Jan 2018 15:15)    PMHx: HTN (hypertension)      PSHx: History of laparotomy      Medications (inpatient): amLODIPine   Tablet 5 milliGRAM(s) Oral daily  aspirin enteric coated 81 milliGRAM(s) Oral daily  dextrose 5% + sodium chloride 0.45%. 1000 milliLiter(s) IV Continuous <Continuous>  enalapril 2.5 milliGRAM(s) Oral daily  metoclopramide 5 milliGRAM(s) Oral every 6 hours  metoprolol     tartrate 12.5 milliGRAM(s) Oral two times a day  pantoprazole    Tablet 40 milliGRAM(s) Oral two times a day before meals  polyethylene glycol 3350 17 Gram(s) Oral two times a day  simethicone 80 milliGRAM(s) Chew two times a day  sodium chloride 0.9% lock flush 3 milliLiter(s) IV Push every 8 hours  sucralfate 1 Gram(s) Oral two times a day    Medications (PRN):bisacodyl Suppository 10 milliGRAM(s) Rectal daily PRN  ondansetron Injectable 4 milliGRAM(s) IV Push every 6 hours PRN    Home Medications:  amLODIPine 5 mg oral tablet: 1 tab(s) orally once a day (30 Jan 2018 15:48)      Allergies: No Known Allergies  (Intolerances: x)    Social Hx: denies EtOH or tobacco use.    Physical Exam  T(C): 36.7 (02-02-18 @ 03:31)  HR: 60 (02-02-18 @ 03:31) (60 - 79)  BP: 111/53 (02-02-18 @ 03:31) (111/53 - 140/72)  RR: 18 (02-02-18 @ 03:31) (18 - 18)  SpO2: 98% (02-02-18 @ 03:31) (97% - 100%)  Wt(kg): --  Tmax: T(C): , Max: 37.2 (02-01-18 @ 17:25)  Wt(kg): --    General: well developed, well nourished, NAD  Neuro: alert and oriented, no focal deficits, moves all extremities spontaneously  HEENT: NCAT, anicteric, mucosa moist  Respiratory: airway patent, respirations unlabored on RA  CVS: regular rate and rhythm  Abdomen: soft, nontender, nondistended  Extremities: no edema, sensation and movement grossly intact; dp/pt & radial palpable b/l  Skin: warm, dry, appropriate color    Labs:                        12.3   7.66  )-----------( 253      ( 02 Feb 2018 06:00 )             37.1     PT/INR - ( 31 Jan 2018 20:55 )   PT: 11.3 SEC;   INR: 0.98     PTT - ( 31 Jan 2018 20:55 )  PTT:29.7 SEC      02-02    137  |  100  |  20  ----------------------------<  89  3.4<L>   |  26  |  0.69    Ca    8.6      02 Feb 2018 06:00  Mg     2.4     02-02        ABG - ( 31 Jan 2018 20:53 )  pH: 7.48  /  pCO2: 41    /  pO2: 125   / HCO3: 30    / Base Excess: 6.3   /  SaO2: 98.8          Imaging and other studies:    CT Abdomen and Pelvis w/ Oral Cont and w/ IV Cont (01.28.18 @ 23:10) >  FINDINGS:   The heart is mildly enlarged. There is no pericardial effusion. The lung   bases are clear.    The large and small bowel are normal in caliber without obstruction.   There is no free intraperitoneal air or abdominal abscess. The appendix   is normal. There is a moderate amount of fecal material in the right side   of the colon. There is no abnormal bowel wall thickening or inflammatory   change.    There is mild intrahepatic biliary duct dilatation of uncertain etiology.   The common bile duct is within normal limits of size measuring 5 mm. The   gallbladder is without wall thickening or stones. The liver, spleen,   pancreas and adrenal glands are unremarkable aside from a tiny 5 mm   cystic lesion in the body of the pancreas. The kidneys enhance   symmetrically without hydronephrosis. There are small bilateral renal   cysts.    The abdominal aorta is normal in caliber. There is no retroperitoneal,   pelvic or inguinal adenopathy. There is no ascites.    The urinary bladder, uterus and adnexal structures are unremarkable.    Status post right hip arthroplasty which appears intact. There are   degenerative changes in lumbar spine. There are no acute osseous   abnormalities.    IMPRESSION:   No bowel obstruction. No wall thickening or pericolonic inflammatory   change.    Mild intrahepatic biliary duct dilatation of uncertain etiology. No   gallstones or CBD dilatation.    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 On presentation she was complaining of 3 days of severe 10/10 abdominal pain; partially palliated w/ morphine the pain is currently 5/10. She also complained of some nausea and an episode of vomiting. She reports that she last had a BM 2 days prior to presentation. It was not bloody and there was no diarrhea. She last passed flatus day prior to presentation. She states that eating made the pain significantly worse. She was admitted to Medicine/Cardiology. Since her transfer to [facility] her pain had improved. GI is planning EGD today. CT w/ IV contrast shows patent mesenteric vessels w/ some calcifications. Vascular Surgery is consulted to evaluate for chronic mesenteric ischemia. She is complaining of constant infraumbilical pain this afternoon, not associated w/ PO intake.       HPI:  77 year old female with HTN who presented to [facility] ED 1/28/18 c/o nausea and abdominal pain with subjective fevers and chills x3days, CT abd/pelvis No bowel obstruction. No wall thickening or pericolonic inflammatory change. Mild intrahepatic biliary duct dilatation of uncertain etiology. No gallstones or CBD dilatation. 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     Patient was subsequently discharged to home, subsequently re-presented to [facility] ED 1/30 c/o persistent symptoms with increased intensity. R/I for MI with first troponin 1.7 with EKG with mild SNOW. Patient given NS IVG bolus, Zofran and Tylenol, as well as lorazepam for abd muscle relaxation, with improvement in symptoms along with ASA. Patient urgently transferred to [facility] with an urgent cardiac catheterization on arrival via RRA access revealing EF 40%, LVEDP 15, normal coronary arteries and to be started on ACE-I and BB as per Dr VASILE Red and to be admitted to telemetry for further management.   Patient currently asymptomatic, chest pain and abdominal pain free and afebrile. (30 Jan 2018 15:15)    PMHx: HTN (hypertension)      PSHx: History of laparotomy      Medications (inpatient): amLODIPine   Tablet 5 milliGRAM(s) Oral daily  aspirin enteric coated 81 milliGRAM(s) Oral daily  dextrose 5% + sodium chloride 0.45%. 1000 milliLiter(s) IV Continuous <Continuous>  enalapril 2.5 milliGRAM(s) Oral daily  metoclopramide 5 milliGRAM(s) Oral every 6 hours  metoprolol     tartrate 12.5 milliGRAM(s) Oral two times a day  pantoprazole    Tablet 40 milliGRAM(s) Oral two times a day before meals  polyethylene glycol 3350 17 Gram(s) Oral two times a day  simethicone 80 milliGRAM(s) Chew two times a day  sodium chloride 0.9% lock flush 3 milliLiter(s) IV Push every 8 hours  sucralfate 1 Gram(s) Oral two times a day    Medications (PRN):bisacodyl Suppository 10 milliGRAM(s) Rectal daily PRN  ondansetron Injectable 4 milliGRAM(s) IV Push every 6 hours PRN    Home Medications:  amLODIPine 5 mg oral tablet: 1 tab(s) orally once a day (30 Jan 2018 15:48)      Allergies: No Known Allergies  (Intolerances: x)    Social Hx: denies EtOH or tobacco use.    Physical Exam  T(C): 36.7 (02-02-18 @ 03:31)  HR: 60 (02-02-18 @ 03:31) (60 - 79)  BP: 111/53 (02-02-18 @ 03:31) (111/53 - 140/72)  RR: 18 (02-02-18 @ 03:31) (18 - 18)  SpO2: 98% (02-02-18 @ 03:31) (97% - 100%)  Wt(kg): --  Tmax: T(C): , Max: 37.2 (02-01-18 @ 17:25)  Wt(kg): --    General: well developed, well nourished, NAD  Neuro: alert and oriented, no focal deficits, moves all extremities spontaneously  HEENT: NCAT, anicteric, mucosa moist  Respiratory: airway patent, respirations unlabored on RA  CVS: regular  Abdomen: soft, nondistended; tender throughout  Extremities: no edema, sensation and movement grossly intact; dp/pt & radial palpable b/l  Skin: warm, dry, appropriate color    Labs:                        12.3   7.66  )-----------( 253      ( 02 Feb 2018 06:00 )             37.1     PT/INR - ( 31 Jan 2018 20:55 )   PT: 11.3 SEC;   INR: 0.98     PTT - ( 31 Jan 2018 20:55 )  PTT:29.7 SEC      02-02    137  |  100  |  20  ----------------------------<  89  3.4<L>   |  26  |  0.69    Ca    8.6      02 Feb 2018 06:00  Mg     2.4     02-02        ABG - ( 31 Jan 2018 20:53 )  pH: 7.48  /  pCO2: 41    /  pO2: 125   / HCO3: 30    / Base Excess: 6.3   /  SaO2: 98.8          Imaging and other studies:    CT Abdomen and Pelvis w/ Oral Cont and w/ IV Cont (01.28.18 @ 23:10) >  FINDINGS:   The heart is mildly enlarged. There is no pericardial effusion. The lung   bases are clear.    The large and small bowel are normal in caliber without obstruction.   There is no free intraperitoneal air or abdominal abscess. The appendix   is normal. There is a moderate amount of fecal material in the right side   of the colon. There is no abnormal bowel wall thickening or inflammatory   change.    There is mild intrahepatic biliary duct dilatation of uncertain etiology.   The common bile duct is within normal limits of size measuring 5 mm. The   gallbladder is without wall thickening or stones. The liver, spleen,   pancreas and adrenal glands are unremarkable aside from a tiny 5 mm   cystic lesion in the body of the pancreas. The kidneys enhance   symmetrically without hydronephrosis. There are small bilateral renal   cysts.    The abdominal aorta is normal in caliber. There is no retroperitoneal,   pelvic or inguinal adenopathy. There is no ascites.    The urinary bladder, uterus and adnexal structures are unremarkable.    Status post right hip arthroplasty which appears intact. There are   degenerative changes in lumbar spine. There are no acute osseous   abnormalities.    IMPRESSION:   No bowel obstruction. No wall thickening or pericolonic inflammatory   change.    Mild intrahepatic biliary duct dilatation of uncertain etiology. No   gallstones or CBD dilatation.    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

## 2018-02-03 LAB
BUN SERPL-MCNC: 8 MG/DL — SIGNIFICANT CHANGE UP (ref 7–23)
CALCIUM SERPL-MCNC: 9.6 MG/DL — SIGNIFICANT CHANGE UP (ref 8.4–10.5)
CHLORIDE SERPL-SCNC: 95 MMOL/L — LOW (ref 98–107)
CK MB BLD-MCNC: 2.89 NG/ML — SIGNIFICANT CHANGE UP (ref 1–4.7)
CK MB BLD-MCNC: 3 NG/ML — SIGNIFICANT CHANGE UP (ref 1–4.7)
CK SERPL-CCNC: 64 U/L — SIGNIFICANT CHANGE UP (ref 25–170)
CK SERPL-CCNC: 64 U/L — SIGNIFICANT CHANGE UP (ref 25–170)
CO2 SERPL-SCNC: 25 MMOL/L — SIGNIFICANT CHANGE UP (ref 22–31)
CREAT SERPL-MCNC: 0.5 MG/DL — SIGNIFICANT CHANGE UP (ref 0.5–1.3)
GLUCOSE SERPL-MCNC: 110 MG/DL — HIGH (ref 70–99)
HCT VFR BLD CALC: 46.7 % — HIGH (ref 34.5–45)
HGB BLD-MCNC: 15.9 G/DL — HIGH (ref 11.5–15.5)
MAGNESIUM SERPL-MCNC: 2.1 MG/DL — SIGNIFICANT CHANGE UP (ref 1.6–2.6)
MCHC RBC-ENTMCNC: 29.2 PG — SIGNIFICANT CHANGE UP (ref 27–34)
MCHC RBC-ENTMCNC: 34 % — SIGNIFICANT CHANGE UP (ref 32–36)
MCV RBC AUTO: 85.7 FL — SIGNIFICANT CHANGE UP (ref 80–100)
NRBC # FLD: 0 — SIGNIFICANT CHANGE UP
PLATELET # BLD AUTO: 248 K/UL — SIGNIFICANT CHANGE UP (ref 150–400)
PMV BLD: 10.8 FL — SIGNIFICANT CHANGE UP (ref 7–13)
POTASSIUM SERPL-MCNC: 3.1 MMOL/L — LOW (ref 3.5–5.3)
POTASSIUM SERPL-SCNC: 3.1 MMOL/L — LOW (ref 3.5–5.3)
RBC # BLD: 5.45 M/UL — HIGH (ref 3.8–5.2)
RBC # FLD: 12.8 % — SIGNIFICANT CHANGE UP (ref 10.3–14.5)
SODIUM SERPL-SCNC: 137 MMOL/L — SIGNIFICANT CHANGE UP (ref 135–145)
TROPONIN T SERPL-MCNC: 0.06 NG/ML — SIGNIFICANT CHANGE UP (ref 0–0.06)
TROPONIN T SERPL-MCNC: < 0.06 NG/ML — SIGNIFICANT CHANGE UP (ref 0–0.06)
WBC # BLD: 10.84 K/UL — HIGH (ref 3.8–10.5)
WBC # FLD AUTO: 10.84 K/UL — HIGH (ref 3.8–10.5)

## 2018-02-03 PROCEDURE — 93010 ELECTROCARDIOGRAM REPORT: CPT

## 2018-02-03 RX ORDER — GABAPENTIN 400 MG/1
400 CAPSULE ORAL ONCE
Qty: 0 | Refills: 0 | Status: COMPLETED | OUTPATIENT
Start: 2018-02-03 | End: 2018-02-03

## 2018-02-03 RX ORDER — POTASSIUM CHLORIDE 20 MEQ
40 PACKET (EA) ORAL ONCE
Qty: 0 | Refills: 0 | Status: COMPLETED | OUTPATIENT
Start: 2018-02-03 | End: 2018-02-03

## 2018-02-03 RX ORDER — PANTOPRAZOLE SODIUM 20 MG/1
40 TABLET, DELAYED RELEASE ORAL ONCE
Qty: 0 | Refills: 0 | Status: COMPLETED | OUTPATIENT
Start: 2018-02-03 | End: 2018-02-03

## 2018-02-03 RX ORDER — CALCIUM CARBONATE 500(1250)
1 TABLET ORAL ONCE
Qty: 0 | Refills: 0 | Status: COMPLETED | OUTPATIENT
Start: 2018-02-03 | End: 2018-02-03

## 2018-02-03 RX ADMIN — AMLODIPINE BESYLATE 5 MILLIGRAM(S): 2.5 TABLET ORAL at 06:15

## 2018-02-03 RX ADMIN — Medication 40 MILLIEQUIVALENT(S): at 18:04

## 2018-02-03 RX ADMIN — SODIUM CHLORIDE 75 MILLILITER(S): 9 INJECTION, SOLUTION INTRAVENOUS at 02:53

## 2018-02-03 RX ADMIN — PANTOPRAZOLE SODIUM 40 MILLIGRAM(S): 20 TABLET, DELAYED RELEASE ORAL at 06:15

## 2018-02-03 RX ADMIN — Medication 12.5 MILLIGRAM(S): at 06:15

## 2018-02-03 RX ADMIN — SIMETHICONE 80 MILLIGRAM(S): 80 TABLET, CHEWABLE ORAL at 18:04

## 2018-02-03 RX ADMIN — POLYETHYLENE GLYCOL 3350 17 GRAM(S): 17 POWDER, FOR SOLUTION ORAL at 06:19

## 2018-02-03 RX ADMIN — POLYETHYLENE GLYCOL 3350 17 GRAM(S): 17 POWDER, FOR SOLUTION ORAL at 18:04

## 2018-02-03 RX ADMIN — Medication 1 GRAM(S): at 19:24

## 2018-02-03 RX ADMIN — Medication 30 MILLILITER(S): at 00:50

## 2018-02-03 RX ADMIN — Medication 81 MILLIGRAM(S): at 11:08

## 2018-02-03 RX ADMIN — SODIUM CHLORIDE 3 MILLILITER(S): 9 INJECTION INTRAMUSCULAR; INTRAVENOUS; SUBCUTANEOUS at 06:23

## 2018-02-03 RX ADMIN — Medication 5 MILLIGRAM(S): at 11:08

## 2018-02-03 RX ADMIN — Medication 2.5 MILLIGRAM(S): at 06:15

## 2018-02-03 RX ADMIN — SODIUM CHLORIDE 3 MILLILITER(S): 9 INJECTION INTRAMUSCULAR; INTRAVENOUS; SUBCUTANEOUS at 21:24

## 2018-02-03 RX ADMIN — Medication 5 MILLIGRAM(S): at 18:04

## 2018-02-03 RX ADMIN — SODIUM CHLORIDE 3 MILLILITER(S): 9 INJECTION INTRAMUSCULAR; INTRAVENOUS; SUBCUTANEOUS at 14:17

## 2018-02-03 RX ADMIN — Medication 5 MILLIGRAM(S): at 00:50

## 2018-02-03 RX ADMIN — Medication 1 GRAM(S): at 06:15

## 2018-02-03 RX ADMIN — SIMETHICONE 80 MILLIGRAM(S): 80 TABLET, CHEWABLE ORAL at 06:15

## 2018-02-03 RX ADMIN — Medication 12.5 MILLIGRAM(S): at 18:04

## 2018-02-03 RX ADMIN — GABAPENTIN 400 MILLIGRAM(S): 400 CAPSULE ORAL at 19:24

## 2018-02-03 RX ADMIN — Medication 1 TABLET(S): at 19:23

## 2018-02-03 RX ADMIN — Medication 5 MILLIGRAM(S): at 06:18

## 2018-02-03 RX ADMIN — ONDANSETRON 4 MILLIGRAM(S): 8 TABLET, FILM COATED ORAL at 15:37

## 2018-02-03 RX ADMIN — PANTOPRAZOLE SODIUM 40 MILLIGRAM(S): 20 TABLET, DELAYED RELEASE ORAL at 18:04

## 2018-02-03 NOTE — PROGRESS NOTE ADULT - SUBJECTIVE AND OBJECTIVE BOX
INTERVAL HPI/OVERNIGHT EVENTS:  HPI:  77 year old female with HTN who [facility] c/o nausea and abdominal pain with subjective fevers and chills x3days, CT abd/pelvis No bowel obstruction. No wall thickening or pericolonic inflammatory change. Mild intrahepatic biliary duct dilatation of uncertain etiology. No gallstones or CBD dilatation. 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   Patient was subsequently discharged to home, subsequently [facility] c/o persistent symptoms with increased intensity. R/I for MI with first troponin 1.7 with EKG with mild SNOW. Patient given NS IVG bolus, Zofran and Tylenol, as well as lorazepam for abd muscle relaxation, with improvement in symptoms along with ASA. Patient urgently transferred to [facility] with an urgent cardiac catheterization on arrival via RRA access revealing EF 40%, LVEDP 15, normal coronary arteries and to be started on ACE-I and BB as per Dr VASILE Red and to be admitted to telemetry for further management.   Patient currently asymptomatic, chest pain and abdominal pain free and afebrile.   No new overnight event.  No N/V/D.  Tolerating diet.  no melena    MEDICATIONS  (STANDING):  amLODIPine   Tablet 5 milliGRAM(s) Oral daily  aspirin enteric coated 81 milliGRAM(s) Oral daily  enalapril 2.5 milliGRAM(s) Oral daily  LORazepam   Injectable 0.5 milliGRAM(s) IV Push once  metoclopramide 5 milliGRAM(s) Oral every 6 hours  metoprolol     tartrate 12.5 milliGRAM(s) Oral two times a day  pantoprazole    Tablet 40 milliGRAM(s) Oral two times a day before meals  polyethylene glycol 3350 17 Gram(s) Oral two times a day  potassium chloride    Tablet ER 40 milliEquivalent(s) Oral once  simethicone 80 milliGRAM(s) Chew two times a day  sodium chloride 0.9% lock flush 3 milliLiter(s) IV Push every 8 hours  sucralfate 1 Gram(s) Oral two times a day    MEDICATIONS  (PRN):  acetaminophen   Tablet. 650 milliGRAM(s) Oral every 6 hours PRN Moderate Pain (4 - 6)  bisacodyl Suppository 10 milliGRAM(s) Rectal daily PRN Constipation  ondansetron Injectable 4 milliGRAM(s) IV Push every 6 hours PRN Nausea and/or Vomiting      Allergies    No Known Allergies    Intolerances    General:  No wt loss, fevers, chills, night sweats, fatigue,   Eyes:  Good vision, no reported pain  ENT:  No sore throat, pain, runny nose, dysphagia  CV:  No pain, palpitations, hypo/hypertension  Resp:  No dyspnea, cough, tachypnea, wheezing  GI:  No pain, No nausea, No vomiting, No diarrhea, No constipation, No weight loss, No fever, No pruritis, No rectal bleeding, No tarry stools, No dysphagia,  :  No pain, bleeding, incontinence, nocturia  Muscle:  No pain, weakness  Neuro:  No weakness, tingling, memory problems  Psych:  No fatigue, insomnia, mood problems, depression  Endocrine:  No polyuria, polydipsia, cold/heat intolerance  Heme:  No petechiae, ecchymosis, easy bruisability  Skin:  No rash, tattoos, scars, edema      PHYSICAL EXAM:   Vital Signs:  Vital Signs Last 24 Hrs  T(C): 36.9 (03 Feb 2018 06:13), Max: 37.5 (02 Feb 2018 18:22)  T(F): 98.4 (03 Feb 2018 06:13), Max: 99.5 (02 Feb 2018 18:22)  HR: 78 (03 Feb 2018 06:13) (51 - 78)  BP: 192/96 (03 Feb 2018 06:13) (125/60 - 215/95)  BP(mean): --  RR: 18 (03 Feb 2018 06:13) (18 - 18)  SpO2: 99% (03 Feb 2018 06:13) (97% - 100%)  Daily Height in cm: 152.4 (02 Feb 2018 14:29)    Daily I&O's Summary    02 Feb 2018 07:01  -  03 Feb 2018 07:00  --------------------------------------------------------  IN: 0 mL / OUT: 400 mL / NET: -400 mL        GENERAL:  Appears stated age, well-groomed, well-nourished, no distress  HEENT:  NC/AT,  conjunctivae clear and pink, no thyromegaly, nodules, adenopathy, no JVD, sclera -anicteric  CHEST:  Full & symmetric excursion, no increased effort, breath sounds clear  HEART:  Regular rhythm, S1, S2, no murmur/rub/S3/S4, no abdominal bruit, no edema  ABDOMEN:  Soft, non-tender, non-distended, normoactive bowel sounds,  no masses ,no hepato-splenomegaly, no signs of chronic liver disease  EXTEREMITIES:  no cyanosis,clubbing or edema  SKIN:  No rash/erythema/ecchymoses/petechiae/wounds/abscess/warm/dry  NEURO:  Alert, oriented, no asterixis, no tremor, no encephalopathy      LABS:                        15.9   10.84 )-----------( 248      ( 03 Feb 2018 05:45 )             46.7     02-03    137  |  95<L>  |  8   ----------------------------<  110<H>  3.1<L>   |  25  |  0.50    Ca    9.6      03 Feb 2018 05:45  Mg     2.1     02-03          amylase   lipaseLipase, Serum: 79 U/L (01-30 @ 10:38)    RADIOLOGY & ADDITIONAL TESTS:

## 2018-02-03 NOTE — PROGRESS NOTE ADULT - ASSESSMENT
Patient is a 77y old  Female who presents with a chief complaint of nausea/Abd pain and fever.    Abd pain:  GI f/up noted.  CT A/P: No acute abdomen.  S/p EGD  IVF  PPI  Reglan.    HTN:  Metoprolol/Norvasc.    Chest pain:  ECHO reviewed.

## 2018-02-03 NOTE — PROVIDER CONTACT NOTE (OTHER) - SITUATION
Pt c/o of abdominal pain & had an elevated BP. MOON Felipe made aware. EKG done, labs drawn & medications given as ordered.

## 2018-02-03 NOTE — CHART NOTE - NSCHARTNOTEFT_GEN_A_CORE
Notified by RN patient complaining of abdominal pain at 8 pm Given IV morphine with mild relief of pain. EGD performed today showed non-bleeding gastric ulcer with no stigmata of bleeding. Biopsied.  Normal examined duodenum. Abdominal exam +BS, ND, TTP to lower abdomen. Last BM last night. +flatus. STAT abdominal Xray showed no urgent findings - will f/u official report in AM. Later on in the night patient complaining of generalized burning abdominal pain. Given Mylanta with improvement of pain. Patient requesting morphine for her pain. When went to examine patient, she was sleeping, appeared comfortable. Notified by RN patient complaining of abdominal pain at 8 pm Given IV morphine with mild relief of pain. EGD performed today showed non-bleeding gastric ulcer with no stigmata of bleeding. Biopsied.  Normal examined duodenum. Abdominal exam +BS, ND, TTP to lower abdomen. Last BM last night. +flatus. STAT abdominal Xray showed no urgent findings - will f/u official report in AM. Later on in the night patient complaining of generalized burning abdominal pain. Given Mylanta with improvement of pain. Patient requesting morphine for her pain. When went to examine patient, she was sleeping, appeared comfortable.    ADDENDUM: 615AM patient woke up endorsing abdominal pain. Abdominal exam +BS, ND, tenderness around the umbilical area. Positive flatus. CT A/P ordered urgent. GI f/u in AM.

## 2018-02-03 NOTE — PROGRESS NOTE ADULT - SUBJECTIVE AND OBJECTIVE BOX
Patient is a 77y old  Female who presents with a chief complaint of     SUBJECTIVE / OVERNIGHT EVENTS:  Mild abd discomfort. No N/V.  Denies CP/SOB/Palpitation/HA.    MEDICATIONS  (STANDING):  amLODIPine   Tablet 5 milliGRAM(s) Oral daily  aspirin enteric coated 81 milliGRAM(s) Oral daily  enalapril 2.5 milliGRAM(s) Oral daily  LORazepam   Injectable 0.5 milliGRAM(s) IV Push once  metoclopramide 5 milliGRAM(s) Oral every 6 hours  metoprolol     tartrate 12.5 milliGRAM(s) Oral two times a day  pantoprazole    Tablet 40 milliGRAM(s) Oral two times a day before meals  polyethylene glycol 3350 17 Gram(s) Oral two times a day  simethicone 80 milliGRAM(s) Chew two times a day  sodium chloride 0.9% lock flush 3 milliLiter(s) IV Push every 8 hours  sucralfate 1 Gram(s) Oral two times a day    MEDICATIONS  (PRN):  acetaminophen   Tablet. 650 milliGRAM(s) Oral every 6 hours PRN Moderate Pain (4 - 6)  bisacodyl Suppository 10 milliGRAM(s) Rectal daily PRN Constipation  ondansetron Injectable 4 milliGRAM(s) IV Push every 6 hours PRN Nausea and/or Vomiting        CAPILLARY BLOOD GLUCOSE        I&O's Summary    02 Feb 2018 07:01  -  03 Feb 2018 07:00  --------------------------------------------------------  IN: 0 mL / OUT: 400 mL / NET: -400 mL        PHYSICAL EXAM:    NECK: Supple, No JVD  CHEST/LUNG: Clear to auscultation bilaterally; No wheezing.  HEART: Regular rate and rhythm; No murmurs, rubs, or gallops  ABDOMEN: Soft, Nontender, Nondistended; Bowel sounds present  EXTREMITIES:   No clubbing, cyanosis, or edema  NEUROLOGY: AAO X 3  SKIN: No rashes    LABS:                        15.9   10.84 )-----------( 248      ( 03 Feb 2018 05:45 )             46.7     02-03    137  |  95<L>  |  8   ----------------------------<  110<H>  3.1<L>   |  25  |  0.50    Ca    9.6      03 Feb 2018 05:45  Mg     2.1     02-03        CARDIAC MARKERS ( 03 Feb 2018 05:45 )  x     / 0.06 ng/mL / 64 u/L / 3.00 ng/mL / x            CAPILLARY BLOOD GLUCOSE                    RADIOLOGY & ADDITIONAL TESTS:    Imaging Personally Reviewed:    Consultant(s) Notes Reviewed:      Care Discussed with Consultants/Other Providers:

## 2018-02-03 NOTE — PROGRESS NOTE ADULT - PROBLEM SELECTOR PLAN 1
- CT Abd noted w/Mild intrahepatic biliary duct dilatation of uncertain etiology. 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

## 2018-02-03 NOTE — PROGRESS NOTE ADULT - ATTENDING COMMENTS
Thank you for the courtesy of the consultation,I would be available for any further discussion if needed.  Serafin Rizo MD,FACC.  [address] [place], [place] [id number] [phone]
Advanced care planning was discussed with patient and family.  Advanced care planning forms were reviewed and discussed.  Risks, benefits and alternatives of gastroenterologic procedures were discussed in detail and all questions were answered.    25 minutes spent.
patient agrees with  upper gastrointestinal endoscopy in am

## 2018-02-04 ENCOUNTER — TRANSCRIPTION ENCOUNTER (OUTPATIENT)
Age: 78
End: 2018-02-04

## 2018-02-04 LAB
BUN SERPL-MCNC: 13 MG/DL — SIGNIFICANT CHANGE UP (ref 7–23)
BUN SERPL-MCNC: 13 MG/DL — SIGNIFICANT CHANGE UP (ref 7–23)
CALCIUM SERPL-MCNC: 9.5 MG/DL — SIGNIFICANT CHANGE UP (ref 8.4–10.5)
CALCIUM SERPL-MCNC: 9.5 MG/DL — SIGNIFICANT CHANGE UP (ref 8.4–10.5)
CHLORIDE SERPL-SCNC: 98 MMOL/L — SIGNIFICANT CHANGE UP (ref 98–107)
CHLORIDE SERPL-SCNC: 98 MMOL/L — SIGNIFICANT CHANGE UP (ref 98–107)
CO2 SERPL-SCNC: 27 MMOL/L — SIGNIFICANT CHANGE UP (ref 22–31)
CO2 SERPL-SCNC: 27 MMOL/L — SIGNIFICANT CHANGE UP (ref 22–31)
CREAT SERPL-MCNC: 0.72 MG/DL — SIGNIFICANT CHANGE UP (ref 0.5–1.3)
CREAT SERPL-MCNC: 0.72 MG/DL — SIGNIFICANT CHANGE UP (ref 0.5–1.3)
GLUCOSE SERPL-MCNC: 103 MG/DL — HIGH (ref 70–99)
GLUCOSE SERPL-MCNC: 103 MG/DL — HIGH (ref 70–99)
HCT VFR BLD CALC: 46.3 % — HIGH (ref 34.5–45)
HGB BLD-MCNC: 16 G/DL — HIGH (ref 11.5–15.5)
MAGNESIUM SERPL-MCNC: 2.5 MG/DL — SIGNIFICANT CHANGE UP (ref 1.6–2.6)
MCHC RBC-ENTMCNC: 30 PG — SIGNIFICANT CHANGE UP (ref 27–34)
MCHC RBC-ENTMCNC: 34.6 % — SIGNIFICANT CHANGE UP (ref 32–36)
MCV RBC AUTO: 86.7 FL — SIGNIFICANT CHANGE UP (ref 80–100)
NRBC # FLD: 0 — SIGNIFICANT CHANGE UP
PLATELET # BLD AUTO: 293 K/UL — SIGNIFICANT CHANGE UP (ref 150–400)
PMV BLD: 10.1 FL — SIGNIFICANT CHANGE UP (ref 7–13)
POTASSIUM SERPL-MCNC: 4 MMOL/L — SIGNIFICANT CHANGE UP (ref 3.5–5.3)
POTASSIUM SERPL-MCNC: 4 MMOL/L — SIGNIFICANT CHANGE UP (ref 3.5–5.3)
POTASSIUM SERPL-SCNC: 4 MMOL/L — SIGNIFICANT CHANGE UP (ref 3.5–5.3)
POTASSIUM SERPL-SCNC: 4 MMOL/L — SIGNIFICANT CHANGE UP (ref 3.5–5.3)
RBC # BLD: 5.34 M/UL — HIGH (ref 3.8–5.2)
RBC # FLD: 13.2 % — SIGNIFICANT CHANGE UP (ref 10.3–14.5)
SODIUM SERPL-SCNC: 138 MMOL/L — SIGNIFICANT CHANGE UP (ref 135–145)
SODIUM SERPL-SCNC: 138 MMOL/L — SIGNIFICANT CHANGE UP (ref 135–145)
WBC # BLD: 9.96 K/UL — SIGNIFICANT CHANGE UP (ref 3.8–10.5)
WBC # FLD AUTO: 9.96 K/UL — SIGNIFICANT CHANGE UP (ref 3.8–10.5)

## 2018-02-04 RX ORDER — GABAPENTIN 400 MG/1
200 CAPSULE ORAL ONCE
Qty: 0 | Refills: 0 | Status: COMPLETED | OUTPATIENT
Start: 2018-02-04 | End: 2018-02-04

## 2018-02-04 RX ORDER — CALCIUM CARBONATE 500(1250)
1 TABLET ORAL EVERY 6 HOURS
Qty: 0 | Refills: 0 | Status: DISCONTINUED | OUTPATIENT
Start: 2018-02-04 | End: 2018-02-08

## 2018-02-04 RX ORDER — ALPRAZOLAM 0.25 MG
0.5 TABLET ORAL ONCE
Qty: 0 | Refills: 0 | Status: DISCONTINUED | OUTPATIENT
Start: 2018-02-04 | End: 2018-02-05

## 2018-02-04 RX ADMIN — SODIUM CHLORIDE 3 MILLILITER(S): 9 INJECTION INTRAMUSCULAR; INTRAVENOUS; SUBCUTANEOUS at 21:03

## 2018-02-04 RX ADMIN — GABAPENTIN 200 MILLIGRAM(S): 400 CAPSULE ORAL at 21:03

## 2018-02-04 RX ADMIN — Medication 12.5 MILLIGRAM(S): at 06:25

## 2018-02-04 RX ADMIN — Medication 5 MILLIGRAM(S): at 14:22

## 2018-02-04 RX ADMIN — POLYETHYLENE GLYCOL 3350 17 GRAM(S): 17 POWDER, FOR SOLUTION ORAL at 06:25

## 2018-02-04 RX ADMIN — SIMETHICONE 80 MILLIGRAM(S): 80 TABLET, CHEWABLE ORAL at 06:25

## 2018-02-04 RX ADMIN — SODIUM CHLORIDE 3 MILLILITER(S): 9 INJECTION INTRAMUSCULAR; INTRAVENOUS; SUBCUTANEOUS at 06:28

## 2018-02-04 RX ADMIN — SIMETHICONE 80 MILLIGRAM(S): 80 TABLET, CHEWABLE ORAL at 17:51

## 2018-02-04 RX ADMIN — PANTOPRAZOLE SODIUM 40 MILLIGRAM(S): 20 TABLET, DELAYED RELEASE ORAL at 16:26

## 2018-02-04 RX ADMIN — SODIUM CHLORIDE 3 MILLILITER(S): 9 INJECTION INTRAMUSCULAR; INTRAVENOUS; SUBCUTANEOUS at 14:22

## 2018-02-04 RX ADMIN — Medication 2.5 MILLIGRAM(S): at 06:25

## 2018-02-04 RX ADMIN — Medication 1 GRAM(S): at 17:52

## 2018-02-04 RX ADMIN — Medication 1 TABLET(S): at 18:33

## 2018-02-04 RX ADMIN — Medication 81 MILLIGRAM(S): at 14:22

## 2018-02-04 RX ADMIN — Medication 12.5 MILLIGRAM(S): at 17:51

## 2018-02-04 RX ADMIN — ONDANSETRON 4 MILLIGRAM(S): 8 TABLET, FILM COATED ORAL at 03:09

## 2018-02-04 RX ADMIN — AMLODIPINE BESYLATE 5 MILLIGRAM(S): 2.5 TABLET ORAL at 06:25

## 2018-02-04 NOTE — DISCHARGE NOTE ADULT - OTHER SIGNIFICANT FINDINGS
1/30 CATH: EF normal coronary arteries; RRA access   1/30 RVP negative  CT Abd noted w/Mild intrahepatic biliary duct dilatation of uncertain etiology. 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 Normal common bile duct. Normal pancreas.  2/2 TTE EF 50%  1. Normal left ventricular systolic function. No segmental  wall motion abnormalities.  2. Mild diastolic dysfunction (Stage I).  3. Normal right ventricular size and function.  4. Estimated pulmonary artery systolic pressure equals 40  mm Hg, assuming right atrial pressure equals 10  mm Hg,  consistent with mild pulmonary hypertension.  2/2 EGD -  Normal esophagus.                       - Non-bleeding gastric ulcer with no stigmata of                        bleeding. Biopsied.                       - Normal examined duodenum.  Recommendation:      - [facility].                       - Resume regular diet.                       - Await pathology results.                       - Use Protonix (pantoprazole) 40 mg PO BID.  2/3 Abd xray- no abdominal disease See above

## 2018-02-04 NOTE — DISCHARGE NOTE ADULT - HOSPITAL COURSE
77 year old female with HTN who [facility] c/o nausea and abdominal pain with subjective fevers and chills x3days, CT abd/pelvis negative and discharged to home, subsequently [facility] c/o persistent symptoms with increased intensity. R/I for MI with first troponin 1.7 with EKG with mild SNOW. Urgent txf to [facility] for cath    1/30 CATH: EF normal coronary arteries; RRA access     OFF TELE  1/30 CATH: EF normal coronary arteries; RRA access   1/30 RVP negative  CT Abd noted w/Mild intrahepatic biliary duct dilatation of uncertain etiology. 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 Normal common bile duct. Normal pancreas.  2/2 TTE EF 50%  1. Normal left ventricular systolic function. No segmental  wall motion abnormalities.  2. Mild diastolic dysfunction (Stage I).  3. Normal right ventricular size and function.  4. Estimated pulmonary artery systolic pressure equals 40  mm Hg, assuming right atrial pressure equals 10  mm Hg,  consistent with mild pulmonary hypertension.  2/2 EGD -  Normal esophagus.                       - Non-bleeding gastric ulcer with no stigmata of                        bleeding. Biopsied.                       - Normal examined duodenum.  Recommendation:      - [facility].                       - Resume regular diet.                       - Await pathology results.                       - Use Protonix (pantoprazole) 40 mg PO BID.  2/3 Abd xray- no abdominal disease 78 y/o female with PMHx of HTN presented to [facility] ED on 1/28/18 complaining of nausea and abdominal pain with subjective fevers and chills x3days, CT abd/pelvis negative and patient was discharged home, subsequently re-presented to [facility] ED 1/30/18 complaining of persistent symptoms with increased intensity. Patient ruled in for MI with first troponin 1.7, EKG with mild ST elevation. Patient was transferred to [facility] for cardiac catheterization. Cath revealed normal coronary arteries via RRA, right wrist stable post cath. Echo revealed EF of 50%, normal LVSF, no segmental wall motion abnormalities, mild diastolic dysfunction (Stage I), and mild pulmonary HTN. RVP negative. Abdominal x-ray showed no abdominal disease. GI was following for abdominal pain and recommended CT abdomen, EGD and MRCP. CT Abd noted w/ mild intrahepatic biliary duct dilatation of uncertain etiology. 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 MRCP: Mild dilatation of of intrahepatic ducts could be a normal variant. Normal common bile duct. Normal pancreas. EGD revealed a nonbleeding gastric ulcer that was biopsied, normal esophagus and duodenum. Patient was started on Protonix BID. Vascular surgery consult was called for possible chronic mesenteric ischemia who reports that pain likely secondary to low flow state in setting of NSTEMI, no acute Vascular Surgery intervention indicated at this time. 76 y/o female with PMHx of HTN presented to [facility] ED on 1/28/18 complaining of nausea and abdominal pain with subjective fevers and chills x3days, CT abd/pelvis negative and patient was discharged home, subsequently re-presented to [facility] ED 1/30/18 complaining of persistent symptoms with increased intensity. Patient ruled in for MI with first troponin 1.7, EKG with mild ST elevation. Patient was transferred to [facility] for cardiac catheterization. Cath revealed normal coronary arteries via RRA, right wrist stable post cath. Echo revealed EF of 50%, normal LVSF, no segmental wall motion abnormalities, mild diastolic dysfunction (Stage I), and mild pulmonary HTN. RVP negative. Abdominal x-ray showed no abdominal disease. GI was following for abdominal pain and recommended CT abdomen, EGD and MRCP. CT Abd (1/28/18) noted w/ mild intrahepatic biliary duct dilatation of uncertain etiology. 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 Patient refusing repeat CP abdomen. MRCP: Mild dilatation of of intrahepatic ducts could be a normal variant. Normal common bile duct. Normal pancreas. EGD revealed a nonbleeding gastric ulcer that was biopsied, normal esophagus and duodenum. Patient was started on Protonix BID. Vascular surgery consult was called for possible chronic mesenteric ischemia who reports that pain likely secondary to low flow state in setting of NSTEMI, no acute Vascular Surgery intervention indicated at this time. 76 y/o female, with a PmHx of HTN, presented to [facility] ED on 1/28/18 complaining of nausea and abdominal pain with subjective fevers and chills x3days, CT abd/pelvis negative and patient was discharged home, subsequently re-presented to [facility] ED 1/30/18 complaining of persistent symptoms with increased intensity. Patient ruled in for MI with first troponin 1.7, EKG with mild ST elevation. Patient was transferred to [facility] for cardiac catheterization. Cath revealed normal coronary arteries via RRA, right wrist stable post cath. Echo revealed EF of 50%, normal LVSF, no segmental wall motion abnormalities, mild diastolic dysfunction (Stage I), and mild pulmonary HTN. RVP negative. Abdominal x-ray showed no abdominal disease. GI was following for abdominal pain and recommended CT abdomen, EGD and MRCP. CT Abd (1/28/18) noted w/ mild intrahepatic biliary duct dilatation of uncertain etiology. 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 Patient refusing repeat CP abdomen. MRCP: Mild dilatation of of intrahepatic ducts could be a normal variant. Normal common bile duct. Normal pancreas. EGD revealed a nonbleeding gastric ulcer that was biopsied, normal esophagus and duodenum. Patient was started on Protonix BID. Vascular surgery consult was called for possible chronic mesenteric ischemia who reports that pain likely secondary to low flow state in setting of NSTEMI, no acute Vascular Surgery intervention indicated at this time.  Echo done showed an EF of 50%. Normal left ventricular systolic function. No segmental wall motion abnormalities. Mild diastolic dysfunction (Stage I).  Normal right ventricular size and function. Estimated pulmonary artery systolic pressure equals 40 mm Hg, assuming right atrial pressure equals 10  mm Hg, consistent with mild pulmonary hypertension.  Pt was having GI motility issues. Pt was seen by Dr. Terrazas from GI. EGD done showed normal esophagus. Non-bleeding gastric ulcer with no stigmata of bleeding. Biopsied. Normal examined duodenum. Use Protonix (pantoprazole) 40 mg PO BID. AXR done showed no abdominal disease. HIDA scan done showed normal hepatobiliary scan. No radionuclide evidence of acute cholecystitis.  Pt was supposed to have a gastric emptying study but she prefers for it to be done as an outpatient.  Pt comfortable at this time and is medically cleared for discharge home. Outpatient follow up.

## 2018-02-04 NOTE — DISCHARGE NOTE ADULT - CARE PROVIDER_API CALL
Yoly Red (ETHEL), Cardiology; Internal Medicine  [address] [place] [address]  [address]  [place], [place] [id number]  Phone: [phone]  Fax: [phone] Yoly Red (ETHEL), Cardiology; Internal Medicine  [address] [place] [address]  [address]  [place], [place] [id number]  Phone: [phone]  Fax: [phone]    Sanjay Terrazas (DO), Gastroenterology; Internal Medicine  [address] [place] [address]  Phone: [phone]  Fax: [phone]

## 2018-02-04 NOTE — CHART NOTE - NSCHARTNOTEFT_GEN_A_CORE
Patient complains of sensation of tingling in bilateral legs.  States that ever since hip surgery 2 years ago her MD prescribed her gabapentin to "calm her nerves".  She states that she takes 200mg 2-3m times a day.  Will order 200mg Gabapentin and monitor.    Also complains of waking in the night with anxiety for which she takes Xanax as an outpatient.  Will order a dose PRN anxiety tonight.

## 2018-02-04 NOTE — DISCHARGE NOTE ADULT - PATIENT PORTAL LINK FT
You can access the [facility] Patient Portal, offered by [facility], by registering with the following website: [email or web address]

## 2018-02-04 NOTE — DISCHARGE NOTE ADULT - NS AS ACTIVITY OBS
No Heavy lifting/straining Walking-Outdoors allowed/Stairs allowed/Showering allowed/Walking-Indoors allowed/No Heavy lifting/straining

## 2018-02-04 NOTE — DISCHARGE NOTE ADULT - MEDICATION SUMMARY - MEDICATIONS TO TAKE
I will START or STAY ON the medications listed below when I get home from the hospital:    aspirin 81 mg oral delayed release tablet  -- 1 tab(s) by mouth once a day  -- Indication: For CAD    enalapril 2.5 mg oral tablet  -- 1 tab(s) by mouth once a day  -- Indication: For Hypertension    calcium carbonate 500 mg (200 mg elemental calcium) oral tablet, chewable  -- 1 tab(s) by mouth every 6 hours, As needed, Gi upset  -- Indication: For Acid Reflux    gabapentin 100 mg oral capsule  -- 1 cap(s) by mouth once a day  -- Indication: For Neuropathy    metoclopramide 5 mg oral tablet  -- 1 tab(s) by mouth every 6 hours, As needed, nausea/vomiting  -- Indication: For Gastric Motility    Xanax 0.5 mg oral tablet  -- 1 tab(s) by mouth once a day  -- Indication: For Anxiety    metoprolol tartrate 25 mg oral tablet  -- 0.5 tab(s) by mouth 2 times a day   -- It is very important that you take or use this exactly as directed.  Do not skip doses or discontinue unless directed by your doctor.  May cause drowsiness.  Alcohol may intensify this effect.  Use care when operating dangerous machinery.  Some non-prescription drugs may aggravate your condition.  Read all labels carefully.  If a warning appears, check with your doctor before taking.  Take with food or milk.  This drug may impair the ability to drive or operate machinery.  Use care until you become familiar with its effects.    -- Indication: For Hypertension    amLODIPine 5 mg oral tablet  -- 1 tab(s) by mouth once a day  -- Indication: For Hypertension    polyethylene glycol 3350 oral powder for reconstitution  -- 17 gram(s) by mouth 2 times a day  -- Indication: For Constipation    bisacodyl 10 mg rectal suppository  -- 1 suppository(ies) rectally once a day, As needed, Constipation - stop after 2/29/18  -- Indication: For Constipation, slow transit    simethicone 80 mg oral tablet, chewable  -- 1 tab(s) by mouth 2 times a day  -- Indication: For Boating    sucralfate 1 g oral tablet  -- 1 tab(s) by mouth 2 times a day  -- Indication: For GI    pantoprazole 40 mg oral delayed release tablet  -- 1 tab(s) by mouth 2 times a day (before meals)  -- Indication: For Acid Reflux

## 2018-02-04 NOTE — DISCHARGE NOTE ADULT - CARE PLAN
Principal Discharge DX:	NSTEMI (non-ST elevated myocardial infarction)  Goal:	Resolution of symptoms  Assessment and plan of treatment:	Follow with PMD  Take medications as ordered  Secondary Diagnosis:	HTN (hypertension)  Assessment and plan of treatment:	Follow with PMD Principal Discharge DX:	NSTEMI (non-ST elevated myocardial infarction)  Goal:	Resolution of symptoms  Assessment and plan of treatment:	Continue antihypertensives and ASA. DASH diet.  Follow up with your cardiologist and primary care doctor within 1-2 weeks. Call for appointment.  Secondary Diagnosis:	HTN (hypertension)  Assessment and plan of treatment:	Continue antihypertensives. Monitor BP. DASH diet.  Follow up with your primary care doctor within 1-2 weeks. Call for appointment.  Secondary Diagnosis:	Epigastric pain  Assessment and plan of treatment:	Continue PPI, Carafate, and simethicone.  Follow up with Dr. Terrazas on February 22 @ 1:00pm.  You will need a referral from your PCP.  Secondary Diagnosis:	Constipation, slow transit  Assessment and plan of treatment:	Continue Miralax and dulcolax as needed.

## 2018-02-04 NOTE — PROGRESS NOTE ADULT - SUBJECTIVE AND OBJECTIVE BOX
Patient is a 77y old  Female who presents with a chief complaint of     SUBJECTIVE / OVERNIGHT EVENTS:   Feels better.  Denies CP/SOB/Palpitation/HA.    MEDICATIONS  (STANDING):  amLODIPine   Tablet 5 milliGRAM(s) Oral daily  aspirin enteric coated 81 milliGRAM(s) Oral daily  enalapril 2.5 milliGRAM(s) Oral daily  LORazepam   Injectable 0.5 milliGRAM(s) IV Push once  metoclopramide 5 milliGRAM(s) Oral every 6 hours  metoprolol     tartrate 12.5 milliGRAM(s) Oral two times a day  pantoprazole    Tablet 40 milliGRAM(s) Oral two times a day before meals  polyethylene glycol 3350 17 Gram(s) Oral two times a day  simethicone 80 milliGRAM(s) Chew two times a day  sodium chloride 0.9% lock flush 3 milliLiter(s) IV Push every 8 hours  sucralfate 1 Gram(s) Oral two times a day    MEDICATIONS  (PRN):  acetaminophen   Tablet. 650 milliGRAM(s) Oral every 6 hours PRN Moderate Pain (4 - 6)  bisacodyl Suppository 10 milliGRAM(s) Rectal daily PRN Constipation  ondansetron Injectable 4 milliGRAM(s) IV Push every 6 hours PRN Nausea and/or Vomiting        CAPILLARY BLOOD GLUCOSE        I&O's Summary      PHYSICAL EXAM:  GENERAL: NAD, well-developed  HEAD:  Atraumatic, Normocephalic  NECK: Supple, No JVD  CHEST/LUNG: Clear to auscultation bilaterally; No wheezing.  HEART: Regular rate and rhythm; No murmurs, rubs, or gallops  ABDOMEN: Soft, Nontender, Nondistended; Bowel sounds present  EXTREMITIES:   No clubbing, cyanosis, or edema  NEUROLOGY: AAO X 3  SKIN: No rashes    LABS:                        16.0   9.96  )-----------( 293      ( 04 Feb 2018 05:00 )             46.3     02-04    138  |  98  |  13  ----------------------------<  103<H>  4.0   |  27  |  0.72    Ca    9.5      04 Feb 2018 05:00  Mg     2.5     02-04        CARDIAC MARKERS ( 03 Feb 2018 05:45 )  x     / 0.06 ng/mL / 64 u/L / 3.00 ng/mL / x            CAPILLARY BLOOD GLUCOSE                    RADIOLOGY & ADDITIONAL TESTS:    Imaging Personally Reviewed:    Consultant(s) Notes Reviewed:      Care Discussed with Consultants/Other Providers:

## 2018-02-04 NOTE — PROGRESS NOTE ADULT - SUBJECTIVE AND OBJECTIVE BOX
INTERVAL HPI/OVERNIGHT EVENTS:  HPI:  77 year old female with HTN who presented to [facility] ED 1/28/18 c/o nausea and abdominal pain with subjective fevers and chills x3days, CT abd/pelvis No bowel obstruction. No wall thickening or pericolonic inflammatory change. Mild intrahepatic biliary duct dilatation of uncertain etiology. No gallstones or CBD dilatation. 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   Patient was subsequently discharged to home, subsequently re-presented to [facility] ED 1/30 c/o persistent symptoms with increased intensity. R/I for MI with first troponin 1.7 with EKG with mild SNOW. Patient given NS IVG bolus, Zofran and Tylenol, as well as lorazepam for abd muscle relaxation, with improvement in symptoms along with ASA. Patient urgently transferred to [facility] with an urgent cardiac catheterization on arrival via RRA access revealing EF 40%, LVEDP 15, normal coronary arteries and to be started on ACE-I and BB as per Dr VASILE Red and to be admitted to telemetry for further management.   Patient currently asymptomatic, chest pain and abdominal pain free and afebrile.   No new overnight event.  No N/V/D.  Tolerating diet.    MEDICATIONS  (STANDING):  amLODIPine   Tablet 5 milliGRAM(s) Oral daily  aspirin enteric coated 81 milliGRAM(s) Oral daily  enalapril 2.5 milliGRAM(s) Oral daily  LORazepam   Injectable 0.5 milliGRAM(s) IV Push once  metoclopramide 5 milliGRAM(s) Oral every 6 hours  metoprolol     tartrate 12.5 milliGRAM(s) Oral two times a day  pantoprazole    Tablet 40 milliGRAM(s) Oral two times a day before meals  polyethylene glycol 3350 17 Gram(s) Oral two times a day  simethicone 80 milliGRAM(s) Chew two times a day  sodium chloride 0.9% lock flush 3 milliLiter(s) IV Push every 8 hours  sucralfate 1 Gram(s) Oral two times a day    MEDICATIONS  (PRN):  acetaminophen   Tablet. 650 milliGRAM(s) Oral every 6 hours PRN Moderate Pain (4 - 6)  bisacodyl Suppository 10 milliGRAM(s) Rectal daily PRN Constipation  ondansetron Injectable 4 milliGRAM(s) IV Push every 6 hours PRN Nausea and/or Vomiting    Allergies    No Known Allergies    Intolerances  General:  No wt loss, fevers, chills, night sweats, fatigue,   Eyes:  Good vision, no reported pain  ENT:  No sore throat, pain, runny nose, dysphagia  CV:  No pain, palpitations, hypo/hypertension  Resp:  No dyspnea, cough, tachypnea, wheezing  GI:  No pain, No nausea, No vomiting, No diarrhea, No constipation, No weight loss, No fever, No pruritis, No rectal bleeding, No tarry stools, No dysphagia,  :  No pain, bleeding, incontinence, nocturia  Muscle:  No pain, weakness  Neuro:  No weakness, tingling, memory problems  Psych:  No fatigue, insomnia, mood problems, depression  Endocrine:  No polyuria, polydipsia, cold/heat intolerance  Heme:  No petechiae, ecchymosis, easy bruisability  Skin:  No rash, tattoos, scars, edema      PHYSICAL EXAM:   Vital Signs:  Vital Signs Last 24 Hrs  T(C): 37.3 (04 Feb 2018 06:03), Max: 37.5 (03 Feb 2018 21:21)  T(F): 99.1 (04 Feb 2018 06:03), Max: 99.5 (03 Feb 2018 21:21)  HR: 79 (04 Feb 2018 08:03) (66 - 95)  BP: 139/87 (04 Feb 2018 08:03) (122/69 - 194/107)  BP(mean): --  RR: 18 (04 Feb 2018 08:03) (18 - 18)  SpO2: 96% (04 Feb 2018 08:03) (96% - 99%)  Daily     Daily I&O's Summary      GENERAL:  Appears stated age, well-groomed, well-nourished, no distress  HEENT:  NC/AT,  conjunctivae clear and pink, no thyromegaly, nodules, adenopathy, no JVD, sclera -anicteric  CHEST:  Full & symmetric excursion, no increased effort, breath sounds clear  HEART:  Regular rhythm, S1, S2, no murmur/rub/S3/S4, no abdominal bruit, no edema  ABDOMEN:  Soft, non-tender, non-distended, normoactive bowel sounds,  no masses ,no hepato-splenomegaly, no signs of chronic liver disease  EXTEREMITIES:  no cyanosis,clubbing or edema  SKIN:  No rash/erythema/ecchymoses/petechiae/wounds/abscess/warm/dry  NEURO:  Alert, oriented, no asterixis, no tremor, no encephalopathy      LABS:                        16.0   9.96  )-----------( 293      ( 04 Feb 2018 05:00 )             46.3     02-04    138  |  98  |  13  ----------------------------<  103<H>  4.0   |  27  |  0.72    Ca    9.5      04 Feb 2018 05:00  Mg     2.5     02-04          amylase   lipase  RADIOLOGY & ADDITIONAL TESTS:

## 2018-02-04 NOTE — DISCHARGE NOTE ADULT - PLAN OF CARE
Resolution of symptoms Follow with PMD  Take medications as ordered Follow with PMD Continue antihypertensives and ASA. DASH diet.  Follow up with your cardiologist and primary care doctor within 1-2 weeks. Call for appointment. Continue antihypertensives. Monitor BP. DASH diet.  Follow up with your primary care doctor within 1-2 weeks. Call for appointment. Continue PPI, Carafate, and simethicone.  Follow up with Dr. Terrazas on February 22 @ 1:00pm.  You will need a referral from your PCP. Continue Miralax and dulcolax as needed.

## 2018-02-04 NOTE — DISCHARGE NOTE ADULT - FINDINGS/TREATMENT
1/30/18 - normal coronary arteries 2/2/18 - EGD - normal espophagus, nonleeding gastric ulce with no stigmata of bleeding, biopsy done, normal duodenum

## 2018-02-04 NOTE — DISCHARGE NOTE ADULT - HOME CARE AGENCY
[facility] [phone] VISITING NURSE WILL CALL DAY AFTER DISCHARGE

## 2018-02-04 NOTE — DISCHARGE NOTE ADULT - CARE PROVIDERS DIRECT ADDRESSES
[email or web address]

## 2018-02-05 LAB — SURGICAL PATHOLOGY STUDY: SIGNIFICANT CHANGE UP

## 2018-02-05 PROCEDURE — 78226 HEPATOBILIARY SYSTEM IMAGING: CPT | Mod: 26,GC

## 2018-02-05 RX ORDER — ALPRAZOLAM 0.25 MG
0.5 TABLET ORAL ONCE
Qty: 0 | Refills: 0 | Status: DISCONTINUED | OUTPATIENT
Start: 2018-02-05 | End: 2018-02-05

## 2018-02-05 RX ORDER — GABAPENTIN 400 MG/1
100 CAPSULE ORAL DAILY
Qty: 0 | Refills: 0 | Status: DISCONTINUED | OUTPATIENT
Start: 2018-02-05 | End: 2018-02-08

## 2018-02-05 RX ORDER — ALPRAZOLAM 0.25 MG
1 TABLET ORAL
Qty: 0 | Refills: 0 | COMMUNITY

## 2018-02-05 RX ORDER — AMLODIPINE BESYLATE 2.5 MG/1
2.5 TABLET ORAL ONCE
Qty: 0 | Refills: 0 | Status: COMPLETED | OUTPATIENT
Start: 2018-02-05 | End: 2018-02-05

## 2018-02-05 RX ORDER — ALPRAZOLAM 0.25 MG
0.25 TABLET ORAL ONCE
Qty: 0 | Refills: 0 | Status: DISCONTINUED | OUTPATIENT
Start: 2018-02-05 | End: 2018-02-05

## 2018-02-05 RX ADMIN — Medication 0.5 MILLIGRAM(S): at 02:48

## 2018-02-05 RX ADMIN — Medication 0.5 MILLIGRAM(S): at 06:41

## 2018-02-05 RX ADMIN — AMLODIPINE BESYLATE 2.5 MILLIGRAM(S): 2.5 TABLET ORAL at 13:50

## 2018-02-05 RX ADMIN — SODIUM CHLORIDE 3 MILLILITER(S): 9 INJECTION INTRAMUSCULAR; INTRAVENOUS; SUBCUTANEOUS at 13:44

## 2018-02-05 RX ADMIN — Medication 1 TABLET(S): at 12:59

## 2018-02-05 RX ADMIN — AMLODIPINE BESYLATE 5 MILLIGRAM(S): 2.5 TABLET ORAL at 06:43

## 2018-02-05 RX ADMIN — Medication 12.5 MILLIGRAM(S): at 06:45

## 2018-02-05 RX ADMIN — POLYETHYLENE GLYCOL 3350 17 GRAM(S): 17 POWDER, FOR SOLUTION ORAL at 06:41

## 2018-02-05 RX ADMIN — GABAPENTIN 100 MILLIGRAM(S): 400 CAPSULE ORAL at 22:57

## 2018-02-05 RX ADMIN — ONDANSETRON 4 MILLIGRAM(S): 8 TABLET, FILM COATED ORAL at 21:51

## 2018-02-05 RX ADMIN — Medication 5 MILLIGRAM(S): at 06:44

## 2018-02-05 RX ADMIN — Medication 12.5 MILLIGRAM(S): at 18:53

## 2018-02-05 RX ADMIN — SIMETHICONE 80 MILLIGRAM(S): 80 TABLET, CHEWABLE ORAL at 18:57

## 2018-02-05 RX ADMIN — Medication 2.5 MILLIGRAM(S): at 06:44

## 2018-02-05 RX ADMIN — SIMETHICONE 80 MILLIGRAM(S): 80 TABLET, CHEWABLE ORAL at 06:40

## 2018-02-05 RX ADMIN — SODIUM CHLORIDE 3 MILLILITER(S): 9 INJECTION INTRAMUSCULAR; INTRAVENOUS; SUBCUTANEOUS at 21:27

## 2018-02-05 RX ADMIN — Medication 0.25 MILLIGRAM(S): at 22:57

## 2018-02-05 RX ADMIN — Medication 81 MILLIGRAM(S): at 12:59

## 2018-02-05 RX ADMIN — Medication 1 GRAM(S): at 18:53

## 2018-02-05 RX ADMIN — PANTOPRAZOLE SODIUM 40 MILLIGRAM(S): 20 TABLET, DELAYED RELEASE ORAL at 06:47

## 2018-02-05 RX ADMIN — SODIUM CHLORIDE 3 MILLILITER(S): 9 INJECTION INTRAMUSCULAR; INTRAVENOUS; SUBCUTANEOUS at 06:46

## 2018-02-05 RX ADMIN — POLYETHYLENE GLYCOL 3350 17 GRAM(S): 17 POWDER, FOR SOLUTION ORAL at 18:54

## 2018-02-05 RX ADMIN — PANTOPRAZOLE SODIUM 40 MILLIGRAM(S): 20 TABLET, DELAYED RELEASE ORAL at 15:58

## 2018-02-05 RX ADMIN — Medication 1 TABLET(S): at 18:54

## 2018-02-05 RX ADMIN — Medication 1 TABLET(S): at 02:48

## 2018-02-05 NOTE — PHYSICAL THERAPY INITIAL EVALUATION ADULT - CRITERIA FOR SKILLED THERAPEUTIC INTERVENTIONS
predicted duration of therapy intervention/therapy frequency/functional limitations in following categories/impairments found/anticipated discharge recommendation/rehab potential

## 2018-02-05 NOTE — PROGRESS NOTE ADULT - ASSESSMENT
Patient is a 77y old  Female who presents with a chief complaint of nausea/Abd pain and fever.    Abd pain:  GI f/up noted.  HIDA: Normal    HTN:  Metoprolol/Norvasc.    Chest pain:  ECHO reviewed.

## 2018-02-05 NOTE — PHYSICAL THERAPY INITIAL EVALUATION ADULT - ADDITIONAL COMMENTS
Pt. reports owning DME of straight cane, rolling walker.     Pt. was left supine in bed, NAD, call tim within reach. RN Regan made aware of pt. status and participation in PT.

## 2018-02-05 NOTE — PHYSICAL THERAPY INITIAL EVALUATION ADULT - DISCHARGE DISPOSITION, PT EVAL
Anticipating Home with Home Physical Therapy vs Outpatient Physical Therapy. POC to be updated with further functional mobility assessment.

## 2018-02-05 NOTE — PROGRESS NOTE ADULT - PROBLEM SELECTOR PLAN 1
- CT Abd noted w/Mild intrahepatic biliary duct dilatation of uncertain etiology. 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

## 2018-02-05 NOTE — PROGRESS NOTE ADULT - SUBJECTIVE AND OBJECTIVE BOX
Patient is a 77y old  Female who presents with a chief complaint of Transferred to [facility] for cardiac catheterization (04 Feb 2018 08:59)      SUBJECTIVE / OVERNIGHT EVENTS:   Feels better.  Denies CP/SOB/Palpitation/HA.    MEDICATIONS  (STANDING):  amLODIPine   Tablet 5 milliGRAM(s) Oral daily  aspirin enteric coated 81 milliGRAM(s) Oral daily  enalapril 2.5 milliGRAM(s) Oral daily  gabapentin 100 milliGRAM(s) Oral daily  LORazepam   Injectable 0.5 milliGRAM(s) IV Push once  metoprolol     tartrate 12.5 milliGRAM(s) Oral two times a day  pantoprazole    Tablet 40 milliGRAM(s) Oral two times a day before meals  polyethylene glycol 3350 17 Gram(s) Oral two times a day  simethicone 80 milliGRAM(s) Chew two times a day  sodium chloride 0.9% lock flush 3 milliLiter(s) IV Push every 8 hours  sucralfate 1 Gram(s) Oral two times a day    MEDICATIONS  (PRN):  acetaminophen   Tablet. 650 milliGRAM(s) Oral every 6 hours PRN Moderate Pain (4 - 6)  bisacodyl Suppository 10 milliGRAM(s) Rectal daily PRN Constipation  calcium carbonate 500 mG (Tums) Chewable 1 Tablet(s) Chew every 6 hours PRN Gi upset  ondansetron Injectable 4 milliGRAM(s) IV Push every 6 hours PRN Nausea and/or Vomiting        CAPILLARY BLOOD GLUCOSE        I&O's Summary      PHYSICAL EXAM:  GENERAL: NAD, well-developed  HEAD:  Atraumatic, Normocephalic  NECK: Supple, No JVD  CHEST/LUNG: Clear to auscultation bilaterally; No wheezing.  HEART: Regular rate and rhythm; No murmurs, rubs, or gallops  ABDOMEN: Soft, Nontender, Nondistended; Bowel sounds present  EXTREMITIES:   No clubbing, cyanosis, or edema  NEUROLOGY: AAO X 3  SKIN: No rashes    LABS:                        16.0   9.96  )-----------( 293      ( 04 Feb 2018 05:00 )             46.3     02-04    138  |  98  |  13  ----------------------------<  103<H>  4.0   |  27  |  0.72    Ca    9.5      04 Feb 2018 05:00  Mg     2.5     02-04              CAPILLARY BLOOD GLUCOSE                    RADIOLOGY & ADDITIONAL TESTS:    Imaging Personally Reviewed:    Consultant(s) Notes Reviewed:      Care Discussed with Consultants/Other Providers:

## 2018-02-05 NOTE — PHYSICAL THERAPY INITIAL EVALUATION ADULT - PERTINENT HX OF CURRENT PROBLEM, REHAB EVAL
Pt. transferred from [facility] ED with complaints of nausea and abdominal pain. Pt. was ruled in for MI with first troponin 1.7 with EKG with mild SNOW. Transferred to [facility] with an urgent cardiac catheterization. Patient currently asymptomatic, chest pain free.  S/P EGD.

## 2018-02-06 DIAGNOSIS — Z09 ENCOUNTER FOR FOLLOW-UP EXAMINATION AFTER COMPLETED TREATMENT FOR CONDITIONS OTHER THAN MALIGNANT NEOPLASM: ICD-10-CM

## 2018-02-06 LAB
BUN SERPL-MCNC: 17 MG/DL — SIGNIFICANT CHANGE UP (ref 7–23)
CALCIUM SERPL-MCNC: 9.4 MG/DL — SIGNIFICANT CHANGE UP (ref 8.4–10.5)
CHLORIDE SERPL-SCNC: 98 MMOL/L — SIGNIFICANT CHANGE UP (ref 98–107)
CO2 SERPL-SCNC: 26 MMOL/L — SIGNIFICANT CHANGE UP (ref 22–31)
CREAT SERPL-MCNC: 0.65 MG/DL — SIGNIFICANT CHANGE UP (ref 0.5–1.3)
GLUCOSE SERPL-MCNC: 110 MG/DL — HIGH (ref 70–99)
HCT VFR BLD CALC: 46.6 % — HIGH (ref 34.5–45)
HGB BLD-MCNC: 15.5 G/DL — SIGNIFICANT CHANGE UP (ref 11.5–15.5)
MAGNESIUM SERPL-MCNC: 2.4 MG/DL — SIGNIFICANT CHANGE UP (ref 1.6–2.6)
MCHC RBC-ENTMCNC: 28.9 PG — SIGNIFICANT CHANGE UP (ref 27–34)
MCHC RBC-ENTMCNC: 33.3 % — SIGNIFICANT CHANGE UP (ref 32–36)
MCV RBC AUTO: 86.9 FL — SIGNIFICANT CHANGE UP (ref 80–100)
NRBC # FLD: 0 — SIGNIFICANT CHANGE UP
PLATELET # BLD AUTO: 343 K/UL — SIGNIFICANT CHANGE UP (ref 150–400)
PMV BLD: 10 FL — SIGNIFICANT CHANGE UP (ref 7–13)
POTASSIUM SERPL-MCNC: 4.2 MMOL/L — SIGNIFICANT CHANGE UP (ref 3.5–5.3)
POTASSIUM SERPL-SCNC: 4.2 MMOL/L — SIGNIFICANT CHANGE UP (ref 3.5–5.3)
RBC # BLD: 5.36 M/UL — HIGH (ref 3.8–5.2)
RBC # FLD: 12.9 % — SIGNIFICANT CHANGE UP (ref 10.3–14.5)
SODIUM SERPL-SCNC: 139 MMOL/L — SIGNIFICANT CHANGE UP (ref 135–145)
WBC # BLD: 9.77 K/UL — SIGNIFICANT CHANGE UP (ref 3.8–10.5)
WBC # FLD AUTO: 9.77 K/UL — SIGNIFICANT CHANGE UP (ref 3.8–10.5)

## 2018-02-06 RX ORDER — BACLOFEN 100 %
10 POWDER (GRAM) MISCELLANEOUS
Qty: 0 | Refills: 0 | Status: DISCONTINUED | OUTPATIENT
Start: 2018-02-06 | End: 2018-02-06

## 2018-02-06 RX ORDER — METOCLOPRAMIDE HCL 10 MG
5 TABLET ORAL EVERY 6 HOURS
Qty: 0 | Refills: 0 | Status: DISCONTINUED | OUTPATIENT
Start: 2018-02-06 | End: 2018-02-06

## 2018-02-06 RX ORDER — METOCLOPRAMIDE HCL 10 MG
5 TABLET ORAL EVERY 6 HOURS
Qty: 0 | Refills: 0 | Status: DISCONTINUED | OUTPATIENT
Start: 2018-02-06 | End: 2018-02-08

## 2018-02-06 RX ADMIN — PANTOPRAZOLE SODIUM 40 MILLIGRAM(S): 20 TABLET, DELAYED RELEASE ORAL at 17:56

## 2018-02-06 RX ADMIN — SODIUM CHLORIDE 3 MILLILITER(S): 9 INJECTION INTRAMUSCULAR; INTRAVENOUS; SUBCUTANEOUS at 06:42

## 2018-02-06 RX ADMIN — AMLODIPINE BESYLATE 5 MILLIGRAM(S): 2.5 TABLET ORAL at 06:41

## 2018-02-06 RX ADMIN — SIMETHICONE 80 MILLIGRAM(S): 80 TABLET, CHEWABLE ORAL at 06:41

## 2018-02-06 RX ADMIN — SODIUM CHLORIDE 3 MILLILITER(S): 9 INJECTION INTRAMUSCULAR; INTRAVENOUS; SUBCUTANEOUS at 13:11

## 2018-02-06 RX ADMIN — SODIUM CHLORIDE 3 MILLILITER(S): 9 INJECTION INTRAMUSCULAR; INTRAVENOUS; SUBCUTANEOUS at 21:53

## 2018-02-06 RX ADMIN — Medication 1 GRAM(S): at 06:41

## 2018-02-06 RX ADMIN — GABAPENTIN 100 MILLIGRAM(S): 400 CAPSULE ORAL at 17:57

## 2018-02-06 RX ADMIN — PANTOPRAZOLE SODIUM 40 MILLIGRAM(S): 20 TABLET, DELAYED RELEASE ORAL at 06:41

## 2018-02-06 RX ADMIN — Medication 12.5 MILLIGRAM(S): at 17:57

## 2018-02-06 RX ADMIN — Medication 81 MILLIGRAM(S): at 17:56

## 2018-02-06 RX ADMIN — Medication 1 GRAM(S): at 17:56

## 2018-02-06 RX ADMIN — SIMETHICONE 80 MILLIGRAM(S): 80 TABLET, CHEWABLE ORAL at 17:57

## 2018-02-06 RX ADMIN — Medication 12.5 MILLIGRAM(S): at 06:41

## 2018-02-06 RX ADMIN — Medication 2.5 MILLIGRAM(S): at 06:41

## 2018-02-06 RX ADMIN — Medication 10 MILLIGRAM(S): at 06:41

## 2018-02-06 RX ADMIN — POLYETHYLENE GLYCOL 3350 17 GRAM(S): 17 POWDER, FOR SOLUTION ORAL at 17:57

## 2018-02-06 NOTE — PROGRESS NOTE ADULT - ASSESSMENT
Patient is a 77y old  Female who presents with a chief complaint of nausea/Abd pain and fever.    Abd pain:  GI f/up noted.  HIDA: Normal  For gastric emptying study    HTN:  Metoprolol/Norvasc.    Chest pain:  ECHO reviewed.

## 2018-02-06 NOTE — PROGRESS NOTE ADULT - PROBLEM SELECTOR PLAN 1
- CT Abd noted w/Mild intrahepatic biliary duct dilatation of uncertain etiology. 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

## 2018-02-06 NOTE — PROGRESS NOTE ADULT - SUBJECTIVE AND OBJECTIVE BOX
Patient is a 77y old  Female who presents with a chief complaint of Transferred to [facility] for cardiac catheterization (04 Feb 2018 08:59)      SUBJECTIVE / OVERNIGHT EVENTS:  Abd pain - improved    MEDICATIONS  (STANDING):  amLODIPine   Tablet 5 milliGRAM(s) Oral daily  aspirin enteric coated 81 milliGRAM(s) Oral daily  dicyclomine 10 milliGRAM(s) Oral two times a day before meals  enalapril 2.5 milliGRAM(s) Oral daily  gabapentin 100 milliGRAM(s) Oral daily  LORazepam   Injectable 0.5 milliGRAM(s) IV Push once  metoprolol     tartrate 12.5 milliGRAM(s) Oral two times a day  pantoprazole    Tablet 40 milliGRAM(s) Oral two times a day before meals  polyethylene glycol 3350 17 Gram(s) Oral two times a day  simethicone 80 milliGRAM(s) Chew two times a day  sodium chloride 0.9% lock flush 3 milliLiter(s) IV Push every 8 hours  sucralfate 1 Gram(s) Oral two times a day    MEDICATIONS  (PRN):  acetaminophen   Tablet. 650 milliGRAM(s) Oral every 6 hours PRN Moderate Pain (4 - 6)  bisacodyl Suppository 10 milliGRAM(s) Rectal daily PRN Constipation  calcium carbonate 500 mG (Tums) Chewable 1 Tablet(s) Chew every 6 hours PRN Gi upset  metoclopramide 5 milliGRAM(s) Oral every 6 hours PRN nausea/vomiting        CAPILLARY BLOOD GLUCOSE        I&O's Summary      PHYSICAL EXAM:  GENERAL: NAD, well-developed  HEAD:  Atraumatic, Normocephalic  NECK: Supple, No JVD  CHEST/LUNG: Clear to auscultation bilaterally; No wheezing.  HEART: Regular rate and rhythm; No murmurs, rubs, or gallops  ABDOMEN: Soft, Nontender, Nondistended; Bowel sounds present  EXTREMITIES:   No clubbing, cyanosis, or edema  NEUROLOGY: AAO X 3  SKIN: No rashes    LABS:                        15.5   9.77  )-----------( 343      ( 06 Feb 2018 07:20 )             46.6     02-06    139  |  98  |  17  ----------------------------<  110<H>  4.2   |  26  |  0.65    Ca    9.4      06 Feb 2018 07:20  Mg     2.4     02-06              CAPILLARY BLOOD GLUCOSE                    RADIOLOGY & ADDITIONAL TESTS:    Imaging Personally Reviewed:    Consultant(s) Notes Reviewed:      Care Discussed with Consultants/Other Providers:

## 2018-02-06 NOTE — PROGRESS NOTE ADULT - SUBJECTIVE AND OBJECTIVE BOX
INTERVAL HPI/OVERNIGHT EVENTS:    pt is with increased nausea this morning with spitting up clear phlegm  she is still with intermittent abdominal pain     MEDICATIONS  (STANDING):  amLODIPine   Tablet 5 milliGRAM(s) Oral daily  aspirin enteric coated 81 milliGRAM(s) Oral daily  dicyclomine 10 milliGRAM(s) Oral two times a day before meals  enalapril 2.5 milliGRAM(s) Oral daily  gabapentin 100 milliGRAM(s) Oral daily  LORazepam   Injectable 0.5 milliGRAM(s) IV Push once  metoprolol     tartrate 12.5 milliGRAM(s) Oral two times a day  pantoprazole    Tablet 40 milliGRAM(s) Oral two times a day before meals  polyethylene glycol 3350 17 Gram(s) Oral two times a day  simethicone 80 milliGRAM(s) Chew two times a day  sodium chloride 0.9% lock flush 3 milliLiter(s) IV Push every 8 hours  sucralfate 1 Gram(s) Oral two times a day    MEDICATIONS  (PRN):  acetaminophen   Tablet. 650 milliGRAM(s) Oral every 6 hours PRN Moderate Pain (4 - 6)  bisacodyl Suppository 10 milliGRAM(s) Rectal daily PRN Constipation  calcium carbonate 500 mG (Tums) Chewable 1 Tablet(s) Chew every 6 hours PRN Gi upset  ondansetron Injectable 4 milliGRAM(s) IV Push every 6 hours PRN Nausea and/or Vomiting      Allergies    No Known Allergies    Intolerances        Review of Systems:    General:  No wt loss, fevers, chills, night sweats, fatigue   Eyes:  Good vision, no reported pain  ENT:  No sore throat, pain, runny nose, dysphagia  CV:  No pain, palpitations, hypo/hypertension  Resp:  No dyspnea, cough, tachypnea, wheezing  GI:  +pain, +nausea, + clear vomiting, No diarrhea, No constipation, No weight loss, No fever, No pruritis, No rectal bleeding, No melena, No dysphagia  :  No pain, bleeding, incontinence, nocturia  Muscle:  No pain, weakness  Neuro:  No weakness, tingling, memory problems  Psych:  No fatigue, insomnia, mood problems, depression  Endocrine:  No polyuria, polydypsia, cold/heat intolerance  Heme:  No petechiae, ecchymosis, easy bruisability  Skin:  No rash, tattoos, scars, edema      Vital Signs Last 24 Hrs  T(C): 37 (06 Feb 2018 06:33), Max: 37 (06 Feb 2018 06:33)  T(F): 98.6 (06 Feb 2018 06:33), Max: 98.6 (06 Feb 2018 06:33)  HR: 100 (06 Feb 2018 06:33) (60 - 100)  BP: 175/84 (06 Feb 2018 06:33) (95/55 - 175/86)  BP(mean): --  RR: 18 (06 Feb 2018 06:33) (18 - 18)  SpO2: 100% (06 Feb 2018 06:33) (98% - 100%)    PHYSICAL EXAM:    Constitutional: NAD  HEENT: EOMI, throat clear  Neck: No LAD, supple  Respiratory: CTA and P  Cardiovascular: S1 and S2, RRR, no M  Gastrointestinal: BS+, soft, NT/ND, neg HSM,  Extremities: No peripheral edema, neg clubbing, cyanosis  Vascular: 2+ peripheral pulses  Neurological: A/O x 3, no focal deficits  Psychiatric: Normal mood, normal affect  Skin: No rashes      LABS:                        15.5   9.77  )-----------( 343      ( 06 Feb 2018 07:20 )             46.6     02-06    139  |  98  |  17  ----------------------------<  110<H>  4.2   |  26  |  0.65    Ca    9.4      06 Feb 2018 07:20  Mg     2.4     02-06            RADIOLOGY & ADDITIONAL TESTS:    < from: NM Hepatobiliary Scan w/wo Gall Bladder (02.05.18 @ 18:26) >    EXAM:  NM HEPATOBILIARY IMG        PROCEDURE DATE:  Feb 5 2018       INTERPRETATION:  RADIOPHARMACEUTICAL: 3.1 mCi Tc-99m-Mebrofenin, I.V.    CLINICAL INFORMATION: 77-year-old female with recurrent right upper   quadrant abdominal pain and nausea,evaluate for acute cholecystitis.    TECHNIQUE: Dynamic imaging of the anterior abdomen was performed for 1   hour following injection of radiotracer. Static images of the abdomen in   the anterior, right lateral and right anterior oblique views were  obtained immediately thereafter.    COMPARISON:  No prior hepatobiliary scan is available for comparison.     FINDINGS: There is prompt, homogeneous uptake of radiotracer by the   hepatocytes. Activity is first seen in the gallbladder at  55minutesand   in the bowel at 45 minutes. There is adequate clearance of activity from   the liver by the end of the study.    IMPRESSION: Normal hepatobiliary scan.    No radionuclide evidence of acute cholecystitis.                            MICHAEL LEZAMA M.D., NUCLEAR MEDICINE ATTENDING  This document has been electronically signed. Feb 5 2018  6:35PM                < end of copied text >

## 2018-02-07 DIAGNOSIS — Z71.89 OTHER SPECIFIED COUNSELING: ICD-10-CM

## 2018-02-07 LAB
BUN SERPL-MCNC: 20 MG/DL — SIGNIFICANT CHANGE UP (ref 7–23)
CALCIUM SERPL-MCNC: 9.1 MG/DL — SIGNIFICANT CHANGE UP (ref 8.4–10.5)
CHLORIDE SERPL-SCNC: 98 MMOL/L — SIGNIFICANT CHANGE UP (ref 98–107)
CO2 SERPL-SCNC: 27 MMOL/L — SIGNIFICANT CHANGE UP (ref 22–31)
CREAT SERPL-MCNC: 0.68 MG/DL — SIGNIFICANT CHANGE UP (ref 0.5–1.3)
GLUCOSE SERPL-MCNC: 90 MG/DL — SIGNIFICANT CHANGE UP (ref 70–99)
HCT VFR BLD CALC: 41.6 % — SIGNIFICANT CHANGE UP (ref 34.5–45)
HGB BLD-MCNC: 13.6 G/DL — SIGNIFICANT CHANGE UP (ref 11.5–15.5)
MAGNESIUM SERPL-MCNC: 2.4 MG/DL — SIGNIFICANT CHANGE UP (ref 1.6–2.6)
MCHC RBC-ENTMCNC: 29.2 PG — SIGNIFICANT CHANGE UP (ref 27–34)
MCHC RBC-ENTMCNC: 32.7 % — SIGNIFICANT CHANGE UP (ref 32–36)
MCV RBC AUTO: 89.3 FL — SIGNIFICANT CHANGE UP (ref 80–100)
NRBC # FLD: 0 — SIGNIFICANT CHANGE UP
PLATELET # BLD AUTO: 355 K/UL — SIGNIFICANT CHANGE UP (ref 150–400)
PMV BLD: 9.1 FL — SIGNIFICANT CHANGE UP (ref 7–13)
POTASSIUM SERPL-MCNC: 4.1 MMOL/L — SIGNIFICANT CHANGE UP (ref 3.5–5.3)
POTASSIUM SERPL-SCNC: 4.1 MMOL/L — SIGNIFICANT CHANGE UP (ref 3.5–5.3)
RBC # BLD: 4.66 M/UL — SIGNIFICANT CHANGE UP (ref 3.8–5.2)
RBC # FLD: 13.2 % — SIGNIFICANT CHANGE UP (ref 10.3–14.5)
SODIUM SERPL-SCNC: 137 MMOL/L — SIGNIFICANT CHANGE UP (ref 135–145)
WBC # BLD: 10.67 K/UL — HIGH (ref 3.8–10.5)
WBC # FLD AUTO: 10.67 K/UL — HIGH (ref 3.8–10.5)

## 2018-02-07 RX ORDER — AMLODIPINE BESYLATE 2.5 MG/1
1 TABLET ORAL
Qty: 0 | Refills: 0 | COMMUNITY

## 2018-02-07 RX ORDER — SIMETHICONE 80 MG/1
1 TABLET, CHEWABLE ORAL
Qty: 0 | Refills: 0 | COMMUNITY
Start: 2018-02-07

## 2018-02-07 RX ORDER — ASPIRIN/CALCIUM CARB/MAGNESIUM 324 MG
1 TABLET ORAL
Qty: 0 | Refills: 0 | COMMUNITY
Start: 2018-02-07

## 2018-02-07 RX ORDER — CALCIUM CARBONATE 500(1250)
1 TABLET ORAL
Qty: 0 | Refills: 0 | COMMUNITY
Start: 2018-02-07

## 2018-02-07 RX ORDER — SUCRALFATE 1 G
1 TABLET ORAL
Qty: 0 | Refills: 0 | COMMUNITY
Start: 2018-02-07

## 2018-02-07 RX ORDER — PANTOPRAZOLE SODIUM 20 MG/1
1 TABLET, DELAYED RELEASE ORAL
Qty: 0 | Refills: 0 | COMMUNITY
Start: 2018-02-07

## 2018-02-07 RX ORDER — ASPIRIN/CALCIUM CARB/MAGNESIUM 324 MG
1 TABLET ORAL
Qty: 30 | Refills: 0 | OUTPATIENT
Start: 2018-02-07 | End: 2018-03-08

## 2018-02-07 RX ORDER — AMLODIPINE BESYLATE 2.5 MG/1
1 TABLET ORAL
Qty: 0 | Refills: 0 | COMMUNITY
Start: 2018-02-07

## 2018-02-07 RX ORDER — SIMETHICONE 80 MG/1
1 TABLET, CHEWABLE ORAL
Qty: 60 | Refills: 0 | OUTPATIENT
Start: 2018-02-07 | End: 2018-03-08

## 2018-02-07 RX ORDER — SUCRALFATE 1 G
1 TABLET ORAL
Qty: 60 | Refills: 0 | OUTPATIENT
Start: 2018-02-07 | End: 2018-03-08

## 2018-02-07 RX ORDER — METOPROLOL TARTRATE 50 MG
12.5 TABLET ORAL
Qty: 0 | Refills: 0 | COMMUNITY
Start: 2018-02-07

## 2018-02-07 RX ORDER — POLYETHYLENE GLYCOL 3350 17 G/17G
17 POWDER, FOR SOLUTION ORAL
Qty: 30 | Refills: 0 | OUTPATIENT
Start: 2018-02-07 | End: 2018-03-08

## 2018-02-07 RX ORDER — POLYETHYLENE GLYCOL 3350 17 G/17G
17 POWDER, FOR SOLUTION ORAL
Qty: 0 | Refills: 0 | COMMUNITY
Start: 2018-02-07

## 2018-02-07 RX ORDER — METOCLOPRAMIDE HCL 10 MG
1 TABLET ORAL
Qty: 30 | Refills: 0 | OUTPATIENT
Start: 2018-02-07 | End: 2018-03-01

## 2018-02-07 RX ORDER — METOPROLOL TARTRATE 50 MG
0.5 TABLET ORAL
Qty: 30 | Refills: 0 | OUTPATIENT
Start: 2018-02-07 | End: 2018-03-08

## 2018-02-07 RX ORDER — GABAPENTIN 400 MG/1
1 CAPSULE ORAL
Qty: 0 | Refills: 0 | COMMUNITY
Start: 2018-02-07

## 2018-02-07 RX ORDER — GABAPENTIN 400 MG/1
1 CAPSULE ORAL
Qty: 0 | Refills: 0 | COMMUNITY

## 2018-02-07 RX ORDER — METOCLOPRAMIDE HCL 10 MG
1 TABLET ORAL
Qty: 0 | Refills: 0 | COMMUNITY
Start: 2018-02-07

## 2018-02-07 RX ADMIN — Medication 10 MILLIGRAM(S): at 12:34

## 2018-02-07 RX ADMIN — Medication 12.5 MILLIGRAM(S): at 05:42

## 2018-02-07 RX ADMIN — Medication 1 GRAM(S): at 05:42

## 2018-02-07 RX ADMIN — GABAPENTIN 100 MILLIGRAM(S): 400 CAPSULE ORAL at 21:58

## 2018-02-07 RX ADMIN — Medication 1 GRAM(S): at 18:10

## 2018-02-07 RX ADMIN — Medication 10 MILLIGRAM(S): at 18:09

## 2018-02-07 RX ADMIN — SODIUM CHLORIDE 3 MILLILITER(S): 9 INJECTION INTRAMUSCULAR; INTRAVENOUS; SUBCUTANEOUS at 05:40

## 2018-02-07 RX ADMIN — AMLODIPINE BESYLATE 5 MILLIGRAM(S): 2.5 TABLET ORAL at 05:42

## 2018-02-07 RX ADMIN — Medication 2.5 MILLIGRAM(S): at 05:42

## 2018-02-07 RX ADMIN — PANTOPRAZOLE SODIUM 40 MILLIGRAM(S): 20 TABLET, DELAYED RELEASE ORAL at 18:09

## 2018-02-07 RX ADMIN — SIMETHICONE 80 MILLIGRAM(S): 80 TABLET, CHEWABLE ORAL at 18:09

## 2018-02-07 RX ADMIN — PANTOPRAZOLE SODIUM 40 MILLIGRAM(S): 20 TABLET, DELAYED RELEASE ORAL at 05:42

## 2018-02-07 RX ADMIN — SODIUM CHLORIDE 3 MILLILITER(S): 9 INJECTION INTRAMUSCULAR; INTRAVENOUS; SUBCUTANEOUS at 13:25

## 2018-02-07 RX ADMIN — Medication 81 MILLIGRAM(S): at 12:35

## 2018-02-07 RX ADMIN — Medication 12.5 MILLIGRAM(S): at 18:08

## 2018-02-07 RX ADMIN — SIMETHICONE 80 MILLIGRAM(S): 80 TABLET, CHEWABLE ORAL at 05:41

## 2018-02-07 NOTE — PROGRESS NOTE ADULT - SUBJECTIVE AND OBJECTIVE BOX
Patient is a 77y old  Female who presents with a chief complaint of Transferred to [facility] for cardiac catheterization (04 Feb 2018 08:59)      SUBJECTIVE / OVERNIGHT EVENTS:   Feels better.  Denies CP/SOB/Palpitation/HA.    MEDICATIONS  (STANDING):  amLODIPine   Tablet 5 milliGRAM(s) Oral daily  aspirin enteric coated 81 milliGRAM(s) Oral daily  dicyclomine 10 milliGRAM(s) Oral two times a day before meals  enalapril 2.5 milliGRAM(s) Oral daily  gabapentin 100 milliGRAM(s) Oral daily  LORazepam   Injectable 0.5 milliGRAM(s) IV Push once  metoprolol     tartrate 12.5 milliGRAM(s) Oral two times a day  pantoprazole    Tablet 40 milliGRAM(s) Oral two times a day before meals  polyethylene glycol 3350 17 Gram(s) Oral two times a day  simethicone 80 milliGRAM(s) Chew two times a day  sodium chloride 0.9% lock flush 3 milliLiter(s) IV Push every 8 hours  sucralfate 1 Gram(s) Oral two times a day    MEDICATIONS  (PRN):  acetaminophen   Tablet. 650 milliGRAM(s) Oral every 6 hours PRN Moderate Pain (4 - 6)  bisacodyl Suppository 10 milliGRAM(s) Rectal daily PRN Constipation  calcium carbonate 500 mG (Tums) Chewable 1 Tablet(s) Chew every 6 hours PRN Gi upset  metoclopramide 5 milliGRAM(s) Oral every 6 hours PRN nausea/vomiting        CAPILLARY BLOOD GLUCOSE        I&O's Summary      PHYSICAL EXAM:  GENERAL: NAD, well-developed  HEAD:  Atraumatic, Normocephalic  NECK: Supple, No JVD  CHEST/LUNG: Clear to auscultation bilaterally; No wheezing.  HEART: Regular rate and rhythm; No murmurs, rubs, or gallops  ABDOMEN: Soft, Nontender, Nondistended; Bowel sounds present  EXTREMITIES:   No clubbing, cyanosis, or edema  NEUROLOGY: AAO X 3  SKIN: No rashes    LABS:                        13.6   10.67 )-----------( 355      ( 07 Feb 2018 05:30 )             41.6     02-07    137  |  98  |  20  ----------------------------<  90  4.1   |  27  |  0.68    Ca    9.1      07 Feb 2018 05:30  Mg     2.4     02-07              CAPILLARY BLOOD GLUCOSE                    RADIOLOGY & ADDITIONAL TESTS:    Imaging Personally Reviewed:    Consultant(s) Notes Reviewed:      Care Discussed with Consultants/Other Providers:

## 2018-02-07 NOTE — PROGRESS NOTE ADULT - PROBLEM SELECTOR PLAN 1
- CT Abd noted w/Mild intrahepatic biliary duct dilatation of uncertain etiology. 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

## 2018-02-07 NOTE — PROGRESS NOTE ADULT - SUBJECTIVE AND OBJECTIVE BOX
INTERVAL HPI/OVERNIGHT EVENTS:    pt reports doing slightly better today   no new gi events;   "my body needs to heal itself"    MEDICATIONS  (STANDING):  amLODIPine   Tablet 5 milliGRAM(s) Oral daily  aspirin enteric coated 81 milliGRAM(s) Oral daily  dicyclomine 10 milliGRAM(s) Oral two times a day before meals  enalapril 2.5 milliGRAM(s) Oral daily  gabapentin 100 milliGRAM(s) Oral daily  LORazepam   Injectable 0.5 milliGRAM(s) IV Push once  metoprolol     tartrate 12.5 milliGRAM(s) Oral two times a day  pantoprazole    Tablet 40 milliGRAM(s) Oral two times a day before meals  polyethylene glycol 3350 17 Gram(s) Oral two times a day  simethicone 80 milliGRAM(s) Chew two times a day  sodium chloride 0.9% lock flush 3 milliLiter(s) IV Push every 8 hours  sucralfate 1 Gram(s) Oral two times a day    MEDICATIONS  (PRN):  acetaminophen   Tablet. 650 milliGRAM(s) Oral every 6 hours PRN Moderate Pain (4 - 6)  bisacodyl Suppository 10 milliGRAM(s) Rectal daily PRN Constipation  calcium carbonate 500 mG (Tums) Chewable 1 Tablet(s) Chew every 6 hours PRN Gi upset  metoclopramide 5 milliGRAM(s) Oral every 6 hours PRN nausea/vomiting      Allergies    No Known Allergies    Intolerances        Review of Systems:    General:  No wt loss, fevers, chills, night sweats, fatigue   Eyes:  Good vision, no reported pain  ENT:  No sore throat, pain, runny nose, dysphagia  CV:  No pain, palpitations, hypo/hypertension  Resp:  No dyspnea, cough, tachypnea, wheezing  GI:  No pain, No nausea, No vomiting, No diarrhea, No constipation, No weight loss, No fever, No pruritis, No rectal bleeding, No melena, No dysphagia  :  No pain, bleeding, incontinence, nocturia  Muscle:  No pain, weakness  Neuro:  No weakness, tingling, memory problems  Psych:  No fatigue, insomnia, mood problems, depression  Endocrine:  No polyuria, polydypsia, cold/heat intolerance  Heme:  No petechiae, ecchymosis, easy bruisability  Skin:  No rash, tattoos, scars, edema      Vital Signs Last 24 Hrs  T(C): 36.9 (07 Feb 2018 12:29), Max: 36.9 (06 Feb 2018 13:10)  T(F): 98.5 (07 Feb 2018 12:29), Max: 98.5 (06 Feb 2018 13:10)  HR: 66 (07 Feb 2018 12:29) (66 - 78)  BP: 101/45 (07 Feb 2018 12:29) (95/55 - 138/62)  BP(mean): --  RR: 18 (07 Feb 2018 12:29) (16 - 18)  SpO2: 99% (07 Feb 2018 12:29) (99% - 100%)    PHYSICAL EXAM:    Constitutional: NAD  HEENT: EOMI, throat clear  Neck: No LAD, supple  Respiratory: CTA and P  Cardiovascular: S1 and S2, RRR, no M  Gastrointestinal: BS+, soft, NT/ND, neg HSM,  Extremities: No peripheral edema, neg clubbing, cyanosis  Vascular: 2+ peripheral pulses  Neurological: A/O x 3, no focal deficits  Psychiatric: Normal mood, normal affect  Skin: No rashes      LABS:                        13.6   10.67 )-----------( 355      ( 07 Feb 2018 05:30 )             41.6     02-07    137  |  98  |  20  ----------------------------<  90  4.1   |  27  |  0.68    Ca    9.1      07 Feb 2018 05:30  Mg     2.4     02-07            RADIOLOGY & ADDITIONAL TESTS:

## 2018-02-07 NOTE — PROGRESS NOTE ADULT - ASSESSMENT
Patient is a 77y old  Female who presents with a chief complaint of nausea/Abd pain and fever.    Abd pain:  GI f/up noted.  HIDA: Normal      HTN:  Metoprolol/Norvasc.    Chest pain:  ECHO reviewed.    DC planning.

## 2018-02-08 VITALS
OXYGEN SATURATION: 100 % | TEMPERATURE: 98 F | SYSTOLIC BLOOD PRESSURE: 112 MMHG | DIASTOLIC BLOOD PRESSURE: 53 MMHG | RESPIRATION RATE: 18 BRPM | HEART RATE: 55 BPM

## 2018-02-08 PROCEDURE — 93010 ELECTROCARDIOGRAM REPORT: CPT

## 2018-02-08 RX ADMIN — Medication 12.5 MILLIGRAM(S): at 06:00

## 2018-02-08 RX ADMIN — POLYETHYLENE GLYCOL 3350 17 GRAM(S): 17 POWDER, FOR SOLUTION ORAL at 17:18

## 2018-02-08 RX ADMIN — Medication 81 MILLIGRAM(S): at 12:22

## 2018-02-08 RX ADMIN — Medication 1 GRAM(S): at 05:58

## 2018-02-08 RX ADMIN — SIMETHICONE 80 MILLIGRAM(S): 80 TABLET, CHEWABLE ORAL at 05:58

## 2018-02-08 RX ADMIN — Medication 2.5 MILLIGRAM(S): at 05:58

## 2018-02-08 RX ADMIN — AMLODIPINE BESYLATE 5 MILLIGRAM(S): 2.5 TABLET ORAL at 05:58

## 2018-02-08 RX ADMIN — Medication 10 MILLIGRAM(S): at 06:00

## 2018-02-08 RX ADMIN — Medication 10 MILLIGRAM(S): at 17:18

## 2018-02-08 RX ADMIN — POLYETHYLENE GLYCOL 3350 17 GRAM(S): 17 POWDER, FOR SOLUTION ORAL at 05:58

## 2018-02-08 RX ADMIN — Medication 1 GRAM(S): at 17:18

## 2018-02-08 RX ADMIN — PANTOPRAZOLE SODIUM 40 MILLIGRAM(S): 20 TABLET, DELAYED RELEASE ORAL at 05:59

## 2018-02-08 RX ADMIN — PANTOPRAZOLE SODIUM 40 MILLIGRAM(S): 20 TABLET, DELAYED RELEASE ORAL at 17:18

## 2018-02-08 RX ADMIN — SIMETHICONE 80 MILLIGRAM(S): 80 TABLET, CHEWABLE ORAL at 17:18

## 2018-02-08 RX ADMIN — SODIUM CHLORIDE 3 MILLILITER(S): 9 INJECTION INTRAMUSCULAR; INTRAVENOUS; SUBCUTANEOUS at 15:21

## 2018-02-08 NOTE — PROGRESS NOTE ADULT - SUBJECTIVE AND OBJECTIVE BOX
INTERVAL HPI/OVERNIGHT EVENTS:    pt doing better this morning  reports sleeping better; "need to get home to get back to normal"    MEDICATIONS  (STANDING):  amLODIPine   Tablet 5 milliGRAM(s) Oral daily  aspirin enteric coated 81 milliGRAM(s) Oral daily  dicyclomine 10 milliGRAM(s) Oral two times a day before meals  enalapril 2.5 milliGRAM(s) Oral daily  gabapentin 100 milliGRAM(s) Oral daily  LORazepam   Injectable 0.5 milliGRAM(s) IV Push once  metoprolol     tartrate 12.5 milliGRAM(s) Oral two times a day  pantoprazole    Tablet 40 milliGRAM(s) Oral two times a day before meals  polyethylene glycol 3350 17 Gram(s) Oral two times a day  simethicone 80 milliGRAM(s) Chew two times a day  sodium chloride 0.9% lock flush 3 milliLiter(s) IV Push every 8 hours  sucralfate 1 Gram(s) Oral two times a day    MEDICATIONS  (PRN):  acetaminophen   Tablet. 650 milliGRAM(s) Oral every 6 hours PRN Moderate Pain (4 - 6)  bisacodyl Suppository 10 milliGRAM(s) Rectal daily PRN Constipation  calcium carbonate 500 mG (Tums) Chewable 1 Tablet(s) Chew every 6 hours PRN Gi upset  metoclopramide 5 milliGRAM(s) Oral every 6 hours PRN nausea/vomiting      Allergies    No Known Allergies    Intolerances        Review of Systems:    General:  No wt loss, fevers, chills, night sweats, fatigue   Eyes:  Good vision, no reported pain  ENT:  No sore throat, pain, runny nose, dysphagia  CV:  No pain, palpitations, hypo/hypertension  Resp:  No dyspnea, cough, tachypnea, wheezing  GI:  No pain, No nausea, No vomiting, No diarrhea, No constipation, No weight loss, No fever, No pruritis, No rectal bleeding, No melena, No dysphagia  :  No pain, bleeding, incontinence, nocturia  Muscle:  No pain, weakness  Neuro:  No weakness, tingling, memory problems  Psych:  No fatigue, insomnia, mood problems, depression  Endocrine:  No polyuria, polydypsia, cold/heat intolerance  Heme:  No petechiae, ecchymosis, easy bruisability  Skin:  No rash, tattoos, scars, edema      Vital Signs Last 24 Hrs  T(C): 36.2 (08 Feb 2018 11:49), Max: 36.9 (07 Feb 2018 12:29)  T(F): 97.2 (08 Feb 2018 11:49), Max: 98.5 (07 Feb 2018 12:29)  HR: 58 (08 Feb 2018 11:49) (58 - 81)  BP: 118/54 (08 Feb 2018 11:49) (101/45 - 130/61)  BP(mean): --  RR: 18 (08 Feb 2018 11:49) (18 - 18)  SpO2: 100% (08 Feb 2018 11:49) (99% - 100%)    PHYSICAL EXAM:    Constitutional: NAD  HEENT: EOMI, throat clear  Neck: No LAD, supple  Respiratory: CTA and P  Cardiovascular: S1 and S2, RRR, no M  Gastrointestinal: BS+, soft, NT/ND, neg HSM,  Extremities: No peripheral edema, neg clubbing, cyanosis  Vascular: 2+ peripheral pulses  Neurological: A/O x 3, no focal deficits  Psychiatric: Normal mood, normal affect  Skin: No rashes      LABS:                        13.6   10.67 )-----------( 355      ( 07 Feb 2018 05:30 )             41.6     02-07    137  |  98  |  20  ----------------------------<  90  4.1   |  27  |  0.68    Ca    9.1      07 Feb 2018 05:30  Mg     2.4     02-07            RADIOLOGY & ADDITIONAL TESTS:

## 2018-02-08 NOTE — PROGRESS NOTE ADULT - PROVIDER SPECIALTY LIST ADULT
Cardiology
Gastroenterology
Internal Medicine
Gastroenterology
Gastroenterology

## 2018-02-08 NOTE — PROGRESS NOTE ADULT - SUBJECTIVE AND OBJECTIVE BOX
Patient is a 77y old  Female who presents with a chief complaint of Transferred to [facility] for cardiac catheterization (04 Feb 2018 08:59)      SUBJECTIVE / OVERNIGHT EVENTS:   Feels better.  Denies CP/SOB/Palpitation/HA.    MEDICATIONS  (STANDING):  amLODIPine   Tablet 5 milliGRAM(s) Oral daily  aspirin enteric coated 81 milliGRAM(s) Oral daily  enalapril 2.5 milliGRAM(s) Oral daily  gabapentin 100 milliGRAM(s) Oral daily  LORazepam   Injectable 0.5 milliGRAM(s) IV Push once  metoprolol     tartrate 12.5 milliGRAM(s) Oral two times a day  pantoprazole    Tablet 40 milliGRAM(s) Oral two times a day before meals  polyethylene glycol 3350 17 Gram(s) Oral two times a day  simethicone 80 milliGRAM(s) Chew two times a day  sodium chloride 0.9% lock flush 3 milliLiter(s) IV Push every 8 hours  sucralfate 1 Gram(s) Oral two times a day    MEDICATIONS  (PRN):  acetaminophen   Tablet. 650 milliGRAM(s) Oral every 6 hours PRN Moderate Pain (4 - 6)  bisacodyl Suppository 10 milliGRAM(s) Rectal daily PRN Constipation  calcium carbonate 500 mG (Tums) Chewable 1 Tablet(s) Chew every 6 hours PRN Gi upset  metoclopramide 5 milliGRAM(s) Oral every 6 hours PRN nausea/vomiting        CAPILLARY BLOOD GLUCOSE        I&O's Summary      PHYSICAL EXAM:  GENERAL: NAD, well-developed  HEAD:  Atraumatic, Normocephalic  NECK: Supple, No JVD  CHEST/LUNG: Clear to auscultation bilaterally; No wheezing.  HEART: Regular rate and rhythm; No murmurs, rubs, or gallops  ABDOMEN: Soft, Nontender, Nondistended; Bowel sounds present  EXTREMITIES:   No clubbing, cyanosis, or edema  NEUROLOGY: AAO X 3  SKIN: No rashes    LABS:                        13.6   10.67 )-----------( 355      ( 07 Feb 2018 05:30 )             41.6     02-07    137  |  98  |  20  ----------------------------<  90  4.1   |  27  |  0.68    Ca    9.1      07 Feb 2018 05:30  Mg     2.4     02-07              CAPILLARY BLOOD GLUCOSE                    RADIOLOGY & ADDITIONAL TESTS:    Imaging Personally Reviewed:    Consultant(s) Notes Reviewed:      Care Discussed with Consultants/Other Providers:

## 2018-02-08 NOTE — PROGRESS NOTE ADULT - PROBLEM SELECTOR PLAN 3
- outpatient fu in our office on February 22 @ 1:00pm; pt will need a referral from her PCP

## 2018-02-08 NOTE — PROGRESS NOTE ADULT - PROBLEM SELECTOR PROBLEM 2
Constipation, slow transit

## 2018-05-22 NOTE — CONSULT NOTE ADULT - CONSULT REQUESTED DATE/TIME
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 She will be conditioned with Busulfan, Melphalan, and ATG. She is currently doing well with no active issues. Today is day -8. Buslfan PK levels drawn today.

## 2018-06-15 ENCOUNTER — APPOINTMENT (OUTPATIENT)
Dept: ANESTHESIOLOGY | Facility: CLINIC | Age: 78
End: 2018-06-15

## 2018-06-15 ENCOUNTER — OUTPATIENT (OUTPATIENT)
Dept: OUTPATIENT SERVICES | Facility: HOSPITAL | Age: 78
LOS: 1 days | End: 2018-06-15
Payer: COMMERCIAL

## 2018-06-15 DIAGNOSIS — M54.17 RADICULOPATHY, LUMBOSACRAL REGION: ICD-10-CM

## 2018-06-15 DIAGNOSIS — Z98.890 OTHER SPECIFIED POSTPROCEDURAL STATES: Chronic | ICD-10-CM

## 2018-06-15 DIAGNOSIS — M54.16 RADICULOPATHY, LUMBAR REGION: ICD-10-CM

## 2018-06-15 PROCEDURE — 62323 NJX INTERLAMINAR LMBR/SAC: CPT

## 2018-07-17 PROBLEM — I10 ESSENTIAL (PRIMARY) HYPERTENSION: Chronic | Status: INACTIVE | Noted: 2018-01-30 | Resolved: 2018-01-30

## 2019-04-01 PROBLEM — I10 ESSENTIAL (PRIMARY) HYPERTENSION: Chronic | Status: ACTIVE | Noted: 2018-01-30

## 2019-04-05 ENCOUNTER — APPOINTMENT (OUTPATIENT)
Dept: ANESTHESIOLOGY | Facility: CLINIC | Age: 79
End: 2019-04-05

## 2019-04-05 ENCOUNTER — OUTPATIENT (OUTPATIENT)
Dept: OUTPATIENT SERVICES | Facility: HOSPITAL | Age: 79
LOS: 1 days | End: 2019-04-05
Payer: COMMERCIAL

## 2019-04-05 DIAGNOSIS — M54.17 RADICULOPATHY, LUMBOSACRAL REGION: ICD-10-CM

## 2019-04-05 DIAGNOSIS — M54.16 RADICULOPATHY, LUMBAR REGION: ICD-10-CM

## 2019-04-05 DIAGNOSIS — Z98.890 OTHER SPECIFIED POSTPROCEDURAL STATES: Chronic | ICD-10-CM

## 2019-04-05 PROCEDURE — 62323 NJX INTERLAMINAR LMBR/SAC: CPT

## 2019-05-18 ENCOUNTER — TRANSCRIPTION ENCOUNTER (OUTPATIENT)
Age: 79
End: 2019-05-18

## 2019-07-09 ENCOUNTER — APPOINTMENT (OUTPATIENT)
Dept: ANESTHESIOLOGY | Facility: CLINIC | Age: 79
End: 2019-07-09

## 2019-07-09 ENCOUNTER — OUTPATIENT (OUTPATIENT)
Dept: OUTPATIENT SERVICES | Facility: HOSPITAL | Age: 79
LOS: 1 days | End: 2019-07-09
Payer: COMMERCIAL

## 2019-07-09 DIAGNOSIS — M54.16 RADICULOPATHY, LUMBAR REGION: ICD-10-CM

## 2019-07-09 DIAGNOSIS — Z98.890 OTHER SPECIFIED POSTPROCEDURAL STATES: Chronic | ICD-10-CM

## 2019-07-09 PROCEDURE — 62323 NJX INTERLAMINAR LMBR/SAC: CPT

## 2019-07-12 DIAGNOSIS — M54.17 RADICULOPATHY, LUMBOSACRAL REGION: ICD-10-CM

## 2019-12-11 NOTE — ED PROVIDER NOTE - MEDICAL DECISION MAKING DETAILS
Generalized abd pain, subj fever, chills x3 days. Symptoms are flu-like, however possibility of cardiac involvement as well, noted mild ST elevations on ECG and trop elevation. At this time patient again denies CP/SOB, and also states still nauseous but no more abdominal pain. As such low concern for STEMI at this time. Later states abd pain still present. Low suspicion for dissection with nml symmetric pulses, no murmurs, and no radiating pain. D/w Dr. Gonzales of admitting team who d/w Dr. Rizo who accepted patient for transfer to cath lab to [facility]. I then d/w Dr. Rizo who stated he was accepting physician at [facility] and would arrange remainder of transfer, and stated ASA alone with no need for heparin or Plavix. Later received call from Tiffanie of cath lab who directed to initiate Plavix per Dr. Red, which was performed. Hemodynamically stable, non toxic appearing. Transferred to [facility] for cath. Generalized abd pain, subj fever, chills x3 days. Symptoms are flu-like, however possibility of cardiac involvement as well, noted mild ST elevations on ECG and trop elevation. At this time patient again denies CP/SOB, and also states still nauseous but no more abdominal pain. As such low concern for STEMI at this time. Later states abd pain still present. Low suspicion for dissection with nml symmetric pulses, no murmurs, and no radiating pain. Low suspicion for acute abdominal process or AAA in light of negative CT 3 days ago and benign abdominal exam. Given NS, Zofran, Tylenol, as well as lorazepam for abd muscle relaxation, with improvement in symptoms. D/w Dr. Gonzales of admitting team who d/w Dr. Rizo who accepted patient for transfer to cath lab to [facility]. I then d/w Dr. Rizo who stated he was accepting physician at [facility] and would arrange remainder of transfer, and stated ASA alone with no need for heparin or Plavix. Later received call from Tiffanie of cath lab who directed to initiate Plavix per Dr. Red, which was performed. Hemodynamically stable, non toxic appearing. Transferred to [facility] for cath.

## 2020-08-18 ENCOUNTER — APPOINTMENT (OUTPATIENT)
Dept: ANESTHESIOLOGY | Facility: CLINIC | Age: 80
End: 2020-08-18

## 2020-08-18 ENCOUNTER — OUTPATIENT (OUTPATIENT)
Dept: OUTPATIENT SERVICES | Facility: HOSPITAL | Age: 80
LOS: 1 days | End: 2020-08-18
Payer: COMMERCIAL

## 2020-08-18 DIAGNOSIS — M54.17 RADICULOPATHY, LUMBOSACRAL REGION: ICD-10-CM

## 2020-08-18 DIAGNOSIS — M54.16 RADICULOPATHY, LUMBAR REGION: ICD-10-CM

## 2020-08-18 DIAGNOSIS — Z98.890 OTHER SPECIFIED POSTPROCEDURAL STATES: Chronic | ICD-10-CM

## 2020-08-18 PROCEDURE — 62323 NJX INTERLAMINAR LMBR/SAC: CPT

## 2020-11-24 ENCOUNTER — APPOINTMENT (OUTPATIENT)
Dept: ANESTHESIOLOGY | Facility: CLINIC | Age: 80
End: 2020-11-24

## 2020-11-24 ENCOUNTER — OUTPATIENT (OUTPATIENT)
Dept: OUTPATIENT SERVICES | Facility: HOSPITAL | Age: 80
LOS: 1 days | End: 2020-11-24
Payer: COMMERCIAL

## 2020-11-24 DIAGNOSIS — Z98.890 OTHER SPECIFIED POSTPROCEDURAL STATES: Chronic | ICD-10-CM

## 2020-11-24 DIAGNOSIS — M54.16 RADICULOPATHY, LUMBAR REGION: ICD-10-CM

## 2020-11-24 PROCEDURE — 62323 NJX INTERLAMINAR LMBR/SAC: CPT

## 2020-12-22 ENCOUNTER — OUTPATIENT (OUTPATIENT)
Dept: OUTPATIENT SERVICES | Facility: HOSPITAL | Age: 80
LOS: 1 days | End: 2020-12-22

## 2020-12-22 DIAGNOSIS — I25.2 OLD MYOCARDIAL INFARCTION: ICD-10-CM

## 2020-12-22 DIAGNOSIS — R06.00 DYSPNEA, UNSPECIFIED: ICD-10-CM

## 2020-12-22 DIAGNOSIS — Z98.890 OTHER SPECIFIED POSTPROCEDURAL STATES: Chronic | ICD-10-CM

## 2021-04-09 ENCOUNTER — APPOINTMENT (OUTPATIENT)
Dept: DISASTER EMERGENCY | Facility: CLINIC | Age: 81
End: 2021-04-09

## 2021-04-10 LAB — SARS-COV-2 N GENE NPH QL NAA+PROBE: NOT DETECTED

## 2021-04-13 ENCOUNTER — OUTPATIENT (OUTPATIENT)
Dept: OUTPATIENT SERVICES | Facility: HOSPITAL | Age: 81
LOS: 1 days | End: 2021-04-13
Payer: COMMERCIAL

## 2021-04-13 ENCOUNTER — APPOINTMENT (OUTPATIENT)
Dept: ANESTHESIOLOGY | Facility: CLINIC | Age: 81
End: 2021-04-13

## 2021-04-13 DIAGNOSIS — M54.16 RADICULOPATHY, LUMBAR REGION: ICD-10-CM

## 2021-04-13 DIAGNOSIS — Z98.890 OTHER SPECIFIED POSTPROCEDURAL STATES: Chronic | ICD-10-CM

## 2021-04-13 PROCEDURE — 62323 NJX INTERLAMINAR LMBR/SAC: CPT

## 2021-04-20 DIAGNOSIS — M54.17 RADICULOPATHY, LUMBOSACRAL REGION: ICD-10-CM

## 2021-05-26 ENCOUNTER — APPOINTMENT (OUTPATIENT)
Dept: NEUROLOGY | Facility: CLINIC | Age: 81
End: 2021-05-26
Payer: MEDICARE

## 2021-05-26 VITALS
DIASTOLIC BLOOD PRESSURE: 67 MMHG | OXYGEN SATURATION: 95 % | TEMPERATURE: 97.2 F | SYSTOLIC BLOOD PRESSURE: 142 MMHG | HEART RATE: 68 BPM | WEIGHT: 126 LBS | BODY MASS INDEX: 24.74 KG/M2 | HEIGHT: 60 IN

## 2021-05-26 DIAGNOSIS — G44.229 CHRONIC TENSION-TYPE HEADACHE, NOT INTRACTABLE: ICD-10-CM

## 2021-05-26 DIAGNOSIS — S06.0X9A CONCUSSION WITH LOSS OF CONSCIOUSNESS OF UNSPECIFIED DURATION, INITIAL ENCOUNTER: ICD-10-CM

## 2021-05-26 PROCEDURE — 99205 OFFICE O/P NEW HI 60 MIN: CPT

## 2021-05-26 RX ORDER — LISINOPRIL 30 MG/1
TABLET ORAL
Refills: 0 | Status: ACTIVE | COMMUNITY

## 2021-05-26 RX ORDER — SERTRALINE 25 MG/1
TABLET, FILM COATED ORAL
Refills: 0 | Status: ACTIVE | COMMUNITY

## 2021-05-26 NOTE — HISTORY OF PRESENT ILLNESS
[FreeTextEntry1] : The patient is here for tinnitus after a fall without LOC 2020.  Has subjective hearing loss, not evaluated.  Also has pressure like forehead pain without migrainous features.  No seizures.  No dizziness unless stands up quickly.\par \par NO change of meds.  Has not tried any meds.

## 2021-05-26 NOTE — PHYSICAL EXAM
[General Appearance - Alert] : alert [General Appearance - In No Acute Distress] : in no acute distress [Person] : oriented to person [Place] : oriented to place [Time] : oriented to time [Registration Intact] : recent registration memory intact [Concentration Intact] : normal concentrating ability [Naming Objects] : no difficulty naming common objects [Fluency] : fluency intact [Comprehension] : comprehension intact [Vocabulary] : adequate range of vocabulary [Cranial Nerves Oculomotor (III)] : extraocular motion intact [Cranial Nerves Optic (II)] : visual acuity intact bilaterally,  visual fields full to confrontation, pupils equal round and reactive to light [Cranial Nerves Trigeminal (V)] : facial sensation intact symmetrically [Cranial Nerves Facial (VII)] : face symmetrical [Cranial Nerves Vestibulocochlear (VIII)] : hearing was intact bilaterally [Cranial Nerves Glossopharyngeal (IX)] : tongue and palate midline [Cranial Nerves Accessory (XI - Cranial And Spinal)] : head turning and shoulder shrug symmetric [Cranial Nerves Hypoglossal (XII)] : there was no tongue deviation with protrusion [Motor Tone] : muscle tone was normal in all four extremities [Motor Strength] : muscle strength was normal in all four extremities [Involuntary Movements] : no involuntary movements were seen [Sensation Tactile Decrease] : light touch was intact [Abnormal Walk] : normal gait [Balance] : balance was intact [Coordination - Dysmetria Impaired Finger-to-Nose Bilateral] : not present [Coordination - Dysmetria Impaired Heel-to-Shin Bilateral] : not present

## 2021-05-26 NOTE — CONSULT LETTER
[Dear  ___] : Dear  [unfilled], [Consult Letter:] : I had the pleasure of evaluating your patient, [unfilled]. [Please see my note below.] : Please see my note below. [Consult Closing:] : Thank you very much for allowing me to participate in the care of this patient.  If you have any questions, please do not hesitate to contact me. [Sincerely,] : Sincerely, [FreeTextEntry3] : Kalyani Myers MD, MPH\par

## 2021-05-26 NOTE — ASSESSMENT
[FreeTextEntry1] : New tinnitus, tension headaches after concussions; will get MRI brain with and w/o nita, EKG and will consider TCA.  Pt says she never had brain surgery but C[facility] reports that has metal in her brain.

## 2021-05-27 LAB
ANION GAP SERPL CALC-SCNC: 9 MMOL/L
BUN SERPL-MCNC: 12 MG/DL
CALCIUM SERPL-MCNC: 10.1 MG/DL
CHLORIDE SERPL-SCNC: 104 MMOL/L
CO2 SERPL-SCNC: 30 MMOL/L
CREAT SERPL-MCNC: 0.67 MG/DL
GLUCOSE SERPL-MCNC: 96 MG/DL
POTASSIUM SERPL-SCNC: 4.5 MMOL/L
SODIUM SERPL-SCNC: 143 MMOL/L

## 2021-09-17 ENCOUNTER — APPOINTMENT (OUTPATIENT)
Dept: NEUROLOGY | Facility: CLINIC | Age: 81
End: 2021-09-17
Payer: MEDICARE

## 2021-09-17 VITALS
TEMPERATURE: 98.6 F | HEART RATE: 72 BPM | DIASTOLIC BLOOD PRESSURE: 56 MMHG | HEIGHT: 60 IN | WEIGHT: 126 LBS | OXYGEN SATURATION: 95 % | BODY MASS INDEX: 24.74 KG/M2 | SYSTOLIC BLOOD PRESSURE: 105 MMHG

## 2021-09-17 DIAGNOSIS — H93.19 TINNITUS, UNSPECIFIED EAR: ICD-10-CM

## 2021-09-17 PROCEDURE — 99215 OFFICE O/P EST HI 40 MIN: CPT

## 2021-09-17 NOTE — ASSESSMENT
[FreeTextEntry1] : New tinnitus, tension headaches after concussions; the patient continues to have tinnitus, her MRI shows chronic microvascular angiopathy but does not have any indications of screws of calcifications which are mentioned on CAT scan done in winter 2021, the patient has never had surgery in her brain.  EKG shows QTC of 416.  Patient symptoms have been discussed as well as the roles of TCAs and benzodiazepines in the treatment of the symptoms.  She declines treatment at this time.\par \par Follow-up as needed\par \par I spent the time noted on the day of this patient encounter preparing for, providing and documenting the above E/M service and counseling and educate patient on differential, workup, disease course, and treatment/management. Education was provided to the patient during this encounter. All questions and concerns were answered and addressed in detail. The patient verbalized understanding and agreed to plan. Patient was advised to continue to monitor for neurologic symptoms and to notify my office or go to the nearest emergency room if there are any changes. Any orders/referrals and communications were provided as well. \par Side effects of the above medications were discussed in detail including but not limited to applicable black box warning and teratogenicity as appropriate. \par Patient was advised to bring previous records to my office, including CD of imaging, when applicable. \par \par

## 2021-09-17 NOTE — DATA REVIEWED
[de-identified] : Chronic microvascular angiopathy [de-identified] : BMP normal\par EKG shows QTC of 416

## 2021-09-17 NOTE — HISTORY OF PRESENT ILLNESS
[FreeTextEntry1] : The patient is here for tinnitus after a fall without LOC 2020. Has subjective hearing loss, not evaluated. Also has pressure like forehead pain without migrainous features. No seizures. No dizziness unless stands up quickly.\par \par NO change of meds. Has not tried any meds. \par \par The patient was concerned that she had a CAT scan done in winter 2020 which showed screws in her head, she has never had surgery.  Since last visit the patient had an MRI of the brain and EKG.  She continues to have tinnitus at times.\par

## 2021-09-30 DIAGNOSIS — Z01.818 ENCOUNTER FOR OTHER PREPROCEDURAL EXAMINATION: ICD-10-CM

## 2021-10-08 ENCOUNTER — APPOINTMENT (OUTPATIENT)
Dept: DISASTER EMERGENCY | Facility: CLINIC | Age: 81
End: 2021-10-08

## 2021-10-09 LAB — SARS-COV-2 N GENE NPH QL NAA+PROBE: NOT DETECTED

## 2021-10-12 ENCOUNTER — APPOINTMENT (OUTPATIENT)
Dept: ANESTHESIOLOGY | Facility: CLINIC | Age: 81
End: 2021-10-12

## 2021-10-12 ENCOUNTER — OUTPATIENT (OUTPATIENT)
Dept: OUTPATIENT SERVICES | Facility: HOSPITAL | Age: 81
LOS: 1 days | End: 2021-10-12
Payer: COMMERCIAL

## 2021-10-12 DIAGNOSIS — M54.16 RADICULOPATHY, LUMBAR REGION: ICD-10-CM

## 2021-10-12 DIAGNOSIS — Z98.890 OTHER SPECIFIED POSTPROCEDURAL STATES: Chronic | ICD-10-CM

## 2021-10-12 PROCEDURE — 62323 NJX INTERLAMINAR LMBR/SAC: CPT

## 2021-12-23 NOTE — PROGRESS NOTE ADULT - PROBLEM SELECTOR PROBLEM 1
Alert and oriented female had some pain under her right breast along the rib cage after eating around 2100. Pain resolved and then patient developed pain in her chest while watching TV. Denies n/v, reports SOB.
Epigastric pain

## 2022-02-04 NOTE — DISCHARGE NOTE ADULT - IF YOU ARE A SMOKER, IT IS IMPORTANT FOR YOUR HEALTH TO STOP SMOKING. PLEASE BE AWARE THAT SECOND HAND SMOKE IS ALSO HARMFUL.
Stated during annual wellness visit patient was interested in Shingrix vaccine  However, he stated he had important meetings today and would be interested in taking a vaccine today  I called the patient and left a voice message to call the office in case he is still interested 
Statement Selected

## 2022-03-25 ENCOUNTER — OUTPATIENT (OUTPATIENT)
Dept: OUTPATIENT SERVICES | Facility: HOSPITAL | Age: 82
LOS: 1 days | End: 2022-03-25
Payer: COMMERCIAL

## 2022-03-25 ENCOUNTER — APPOINTMENT (OUTPATIENT)
Dept: ANESTHESIOLOGY | Facility: CLINIC | Age: 82
End: 2022-03-25

## 2022-03-25 DIAGNOSIS — M54.16 RADICULOPATHY, LUMBAR REGION: ICD-10-CM

## 2022-03-25 DIAGNOSIS — Z98.890 OTHER SPECIFIED POSTPROCEDURAL STATES: Chronic | ICD-10-CM

## 2022-03-25 PROCEDURE — 62323 NJX INTERLAMINAR LMBR/SAC: CPT

## 2022-09-20 ENCOUNTER — APPOINTMENT (OUTPATIENT)
Dept: ANESTHESIOLOGY | Facility: CLINIC | Age: 82
End: 2022-09-20

## 2022-09-23 ENCOUNTER — OUTPATIENT (OUTPATIENT)
Dept: OUTPATIENT SERVICES | Facility: HOSPITAL | Age: 82
LOS: 1 days | End: 2022-09-23
Payer: COMMERCIAL

## 2022-09-23 ENCOUNTER — APPOINTMENT (OUTPATIENT)
Dept: ANESTHESIOLOGY | Facility: CLINIC | Age: 82
End: 2022-09-23

## 2022-09-23 DIAGNOSIS — Z98.890 OTHER SPECIFIED POSTPROCEDURAL STATES: Chronic | ICD-10-CM

## 2022-09-23 DIAGNOSIS — M54.16 RADICULOPATHY, LUMBAR REGION: ICD-10-CM

## 2022-09-23 PROCEDURE — 62323 NJX INTERLAMINAR LMBR/SAC: CPT

## 2022-09-26 DIAGNOSIS — M54.17 RADICULOPATHY, LUMBOSACRAL REGION: ICD-10-CM

## 2023-02-07 ENCOUNTER — OUTPATIENT (OUTPATIENT)
Dept: OUTPATIENT SERVICES | Facility: HOSPITAL | Age: 83
LOS: 1 days | End: 2023-02-07
Payer: COMMERCIAL

## 2023-02-07 DIAGNOSIS — Z01.810 ENCOUNTER FOR PREPROCEDURAL CARDIOVASCULAR EXAMINATION: ICD-10-CM

## 2023-02-07 DIAGNOSIS — Z98.890 OTHER SPECIFIED POSTPROCEDURAL STATES: Chronic | ICD-10-CM

## 2023-02-07 PROCEDURE — 78452 HT MUSCLE IMAGE SPECT MULT: CPT | Mod: MH

## 2023-02-07 PROCEDURE — A9502: CPT

## 2023-02-07 PROCEDURE — 93017 CV STRESS TEST TRACING ONLY: CPT

## 2023-04-05 NOTE — DISCHARGE NOTE ADULT - NSTOBACCOWEBSITE_GEN_A_NCS
Paraben Mix: no reaction Show Allergen Counseling In The Note?: Yes Detail Level: Zone Gold Sodium Thiosulfate: 1+ What Reading Time Point?: 48 hour Number Of Patches Read: 36 [place] Website --- [email or web address] website --- [email or web address]

## 2023-07-05 NOTE — PATIENT PROFILE ADULT. - FUNCTIONAL SCREEN CURRENT LEVEL: BATHING, MLM
(0) independent Pain Refusal Text: I offered to prescribe pain medication but the patient refused to take this medication.

## 2023-08-28 NOTE — PROGRESS NOTE ADULT - SUBJECTIVE AND OBJECTIVE BOX
PRESENTING CC:Elevated troponins-Abdominal pain    SUBJ: 77 year old female with HTN who presented to [facility] ED 1/28/18 c/o nausea and abdominal pain with subjective fevers and chills x3days, returned 1/30-R/I for MI with first troponin 1.7 with EKG with mild SNOW. Patient urgently transferred to [facility] for an urgent cardiac catheterization revealing EF 40%, LVEDP 15, normal coronary arteries ,and wall motion abnormalities,still c/o abdominal pain,no dyspnea-has been upset since Mothers death 1 week ago.      PMH -reviewed admission note, no change since admission  Heart failure: acute [ ] chronic [ ] acute or chronic [ ] diastolic [ ] systolic [ ] combined systolic and diastolic[ ]  ONUR: ATN[ ] renal medullary necrosis [ ] CKD I [ ]CKDII [ ]CKD III [ ]CKD IV [ ]CKD V [ ]Other pathological lesions [ ]    MEDICATIONS  (STANDING):  amLODIPine   Tablet 5 milliGRAM(s) Oral daily  aspirin enteric coated 81 milliGRAM(s) Oral daily  enalapril 2.5 milliGRAM(s) Oral daily  metoprolol     tartrate 12.5 milliGRAM(s) Oral two times a day  sodium chloride 0.9% lock flush 3 milliLiter(s) IV Push every 8 hours      FAMILY HISTORY:  No pertinent family history in first degree relatives  No family history of premature coronary artery disease or sudden cardiac death      REVIEW OF SYSTEMS:  Constitutional: [ ] fever, [ ]weight loss,  [x ]fatigue  Eyes: [ ] visual changes  Respiratory: [ ]shortness of breath;  [ ] cough, [ ]wheezing, [ ]chills, [ ]hemoptysis  Cardiovascular: [ ] chest pain, [ ]palpitations, [ ]dizziness,  [ ]leg swelling[ ]orthopnea[ ]PND  Gastrointestinal: [x ] abdominal pain, [x ]nausea, [ ]vomiting,  [ ]diarrhea   Genitourinary: [ ] dysuria, [ ] hematuria  Neurologic: [ ] headaches [ ] tremors[ ]weakness  Skin: [ ] itching, [ ]burning, [ ] rashes  Endocrine: [ ] heat or cold intolerance  Musculoskeletal: [ ] joint pain or swelling; [ ] muscle, back, or extremity pain  Psychiatric: [ ] depression, [ ]anxiety, [ ]mood swings, or [ ]difficulty sleeping  Hematologic: [ ] easy bruising, [ ] bleeding gums    [x] All remaining systems negative except as per above.   [ ]Unable to obtain.    Vital Signs Last 24 Hrs  T(C): 37.1 (31 Jan 2018 05:48), Max: 38.1 (30 Jan 2018 12:11)  T(F): 98.7 (31 Jan 2018 05:48), Max: 100.6 (30 Jan 2018 12:11)  HR: 80 (31 Jan 2018 05:48) (80 - 126)  BP: 145/83 (31 Jan 2018 05:48) (103/58 - 172/78)  RR: 17 (31 Jan 2018 05:48) (17 - 18)  SpO2: 98% (31 Jan 2018 05:48) (97% - 100%)  I&O's Summary      PHYSICAL EXAM:  General: No acute distress BMI-21.1  HEENT: EOMI, PERRL  Neck: Supple, [ ] JVD  Lungs: Equal air entry bilaterally; [ ] rales [ ] wheezing [ ] rhonchi  Heart: Regular rate and rhythm; [x ] murmur  2 /6 [x ] systolic [ ] diastolic [ ] radiation[ ] rubs [ ]  gallops  Abdomen:  Mild tender, bowel sounds present  Extremities: No clubbing, cyanosis, [ ] edema  Nervous system:  Alert & Oriented X3, no focal deficits  Psychiatric: Normal affect  Skin: No rashes or lesions    LABS:  01-30    136  |  97  |  11  ----------------------------<  115<H>  4.0   |  29  |  0.74    Ca    9.5      30 Jan 2018 10:38    TPro  8.8<H>  /  Alb  4.2  /  TBili  0.8  /  DBili  x   /  AST  60<H>  /  ALT  44  /  AlkPhos  59  01-30    Creatinine Trend: 0.74<--, 0.72<--                        16.6   12.7  )-----------( 267      ( 30 Jan 2018 10:38 )             52.3       Lipid Panel:   Cardiac Enzymes: CARDIAC MARKERS ( 30 Jan 2018 10:38 )  1.730 ng/mL / x     / x     / x     / x                RADIOLOGY: CT ABDOMEN AND PELVIS  01/28/2018  IMPRESSION:   No bowel obstruction. No wall thickening or pericolonic inflammatory  change.  Mild intrahepatic biliary duct dilatation of uncertain etiology. No  gallstones or CBD dilatation.    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  TTE-WMA    Abdominal Pain-No acute path on CT Abdomen-GI Consult Dr Terrazas called. Quality 110: Preventive Care And Screening: Influenza Immunization: Influenza Immunization Administered during Influenza season Quality 394b: Td/Tdap Immunizations For Adolescents: Patient had one tetanus, diphtheria toxoids and acellular pertussis vaccine (Tdap) on or between the patient's 10th and 13th birthdays. Quality 226: Preventive Care And Screening: Tobacco Use: Screening And Cessation Intervention: Patient screened for tobacco use and is an ex/non-smoker Quality 394a: Meningococcal Immunizations For Adolescents: Patient had one dose of meningococcal vaccine (serogroups A, C, W, Y) on or between the patient's 11th and 13th birthdays. Quality 402: Tobacco Use And Help With Quitting Among Adolescents: Patient screened for tobacco and never smoked Quality 431: Preventive Care And Screening: Unhealthy Alcohol Use - Screening: Patient not identified as an unhealthy alcohol user when screened for unhealthy alcohol use using a systematic screening method Detail Level: Detailed

## 2024-03-06 NOTE — PROGRESS NOTE ADULT - SUBJECTIVE AND OBJECTIVE BOX
Please let her know that work excuses unless they are FMLA do not have to be approved by work.  If she has an appointment mental health or otherwise she should have that addressed with the provider in which she is working with those days.  Please let her know that we do not accommodate future excuses from work.  She can certainly contact use those days with how she is feeling or what she is struggling with but that does not guarantee a excuse.  If she misses work for mental health reasons that could open her up with employer to note she may not be stable enough to continue and she might run into other issues.      If she is looking for FMLA I would recommend going through her primary as I have not known her long enough to feel comfortable speaking about that nor have we tried stabilizing long enough for me to say it needs and leave.     INTERVAL HPI/OVERNIGHT EVENTS:    still c/o abdominal discomfort and nausea/bloating sensation   no vomiting    MEDICATIONS  (STANDING):  amLODIPine   Tablet 5 milliGRAM(s) Oral daily  amLODIPine   Tablet 2.5 milliGRAM(s) Oral once  aspirin enteric coated 81 milliGRAM(s) Oral daily  enalapril 2.5 milliGRAM(s) Oral daily  gabapentin 100 milliGRAM(s) Oral daily  LORazepam   Injectable 0.5 milliGRAM(s) IV Push once  metoprolol     tartrate 12.5 milliGRAM(s) Oral two times a day  pantoprazole    Tablet 40 milliGRAM(s) Oral two times a day before meals  polyethylene glycol 3350 17 Gram(s) Oral two times a day  simethicone 80 milliGRAM(s) Chew two times a day  sodium chloride 0.9% lock flush 3 milliLiter(s) IV Push every 8 hours  sucralfate 1 Gram(s) Oral two times a day    MEDICATIONS  (PRN):  acetaminophen   Tablet. 650 milliGRAM(s) Oral every 6 hours PRN Moderate Pain (4 - 6)  bisacodyl Suppository 10 milliGRAM(s) Rectal daily PRN Constipation  calcium carbonate 500 mG (Tums) Chewable 1 Tablet(s) Chew every 6 hours PRN Gi upset  ondansetron Injectable 4 milliGRAM(s) IV Push every 6 hours PRN Nausea and/or Vomiting      Allergies    No Known Allergies    Intolerances        Review of Systems:    General:  No wt loss, fevers, chills, night sweats, fatigue   Eyes:  Good vision, no reported pain  ENT:  No sore throat, pain, runny nose, dysphagia  CV:  No pain, palpitations, hypo/hypertension  Resp:  No dyspnea, cough, tachypnea, wheezing  GI:  +pain, +nausea, No vomiting, No diarrhea, No constipation, No weight loss, No fever, No pruritis, No rectal bleeding, No melena, No dysphagia  :  No pain, bleeding, incontinence, nocturia  Muscle:  No pain, weakness  Neuro:  No weakness, tingling, memory problems  Psych:  No fatigue, insomnia, mood problems, depression  Endocrine:  No polyuria, polydypsia, cold/heat intolerance  Heme:  No petechiae, ecchymosis, easy bruisability  Skin:  No rash, tattoos, scars, edema      Vital Signs Last 24 Hrs  T(C): 36.6 (05 Feb 2018 12:58), Max: 37.2 (04 Feb 2018 20:53)  T(F): 97.8 (05 Feb 2018 12:58), Max: 99 (04 Feb 2018 20:53)  HR: 78 (05 Feb 2018 12:58) (72 - 102)  BP: 170/78 (05 Feb 2018 12:58) (132/78 - 170/78)  BP(mean): --  RR: 18 (05 Feb 2018 12:58) (17 - 18)  SpO2: 98% (05 Feb 2018 12:58) (98% - 100%)    PHYSICAL EXAM:    Constitutional: NAD  HEENT: EOMI, throat clear  Neck: No LAD, supple  Respiratory: CTA and P  Cardiovascular: S1 and S2, RRR, no M  Gastrointestinal: BS+, soft, NT/ND, neg HSM,  Extremities: No peripheral edema, neg clubbing, cyanosis  Vascular: 2+ peripheral pulses  Neurological: A/O x 3, no focal deficits  Psychiatric: Normal mood, normal affect  Skin: No rashes      LABS:                        16.0   9.96  )-----------( 293      ( 04 Feb 2018 05:00 )             46.3     02-04    138  |  98  |  13  ----------------------------<  103<H>  4.0   |  27  |  0.72    Ca    9.5      04 Feb 2018 05:00  Mg     2.5     02-04            RADIOLOGY & ADDITIONAL TESTS:  < from: Upper Endoscopy (02.02.18 @ 16:04) >    [facility]  _______________________________________________________________________________  Patient Name: Shahana Singh         Procedure Date: 2/2/2018 4:04 PM  MRN: [id number]                     Account Number: [id number]  YOB: 1940               Admit Type: Inpatient  Room: [facility] 03                         Gender: Female  Attending MD: JANIYA VERDIN DO        _______________________________________________________________________________     Procedure:           Upper GI endoscopy  Indications:         Epigastric abdominal pain  Providers:           JANIYA VERDIN DO  Medicines:           Monitored Anesthesia Care  Complications:       No immediate complications.  Procedure:           After obtaining informed consent, the endoscope was                        passed under direct vision. Throughout the procedure,                        the patient's blood pressure, pulse, and oxygen                        saturations were monitored continuously. The Endoscope                        was introduced through the mouth, and advanced to the                        second part of duodenum. The upper GI endoscopy was                        accomplished without difficulty. The patient tolerated                    the procedure well.                                                                                   Findings:       The examined esophagus was normal.       One non-bleeding superficial gastric ulcer with no stigmata of bleeding       was found in the gastric body. Biopsies were taken with a cold forceps        for histology. Estimated blood loss: none.       The examined duodenum was normal.                                                                                   Impression:          - Normal esophagus.                       - Non-bleeding gastric ulcer with no stigmata of                        bleeding. Biopsied.                       - Normal examined duodenum.  Recommendation:      - [facility].                       - Resume regular diet.                       - Await pathology results.                       - Use Protonix (pantoprazole) 40 mg PO BID.                 Attending Participation:       I personally performed the entire procedure. I was present and        participated during the entire procedure, including non-key portions.                     _________________  JANIYA VERDIN DO  2/2/2018 4:49:00 PM  This report has been signed electronically.  Number of Addenda: 0    Note Initiated On: 2/2/2018 4:04 PM    < end of copied text >      ____________    < from: MR MRCROXY w/wo IV Cont (02.01.18 @ 12:05) >    EXAM:  MR MRCROXY WAW IC        PROCEDURE DATE:  Feb 1 2018         INTERPRETATION:  CLINICAL INFORMATION: Question biliary ductal   dilatation/pancreatic lesion on CT. Myocardial infarct.    COMPARISON: CT from January 28, 2018    PROCEDURE:   MRI of the abdomen was performed with and without intravenous contrast.  5 mL of Gadavist was administered intravenously without complication and   2.5 mL was discarded.      FINDINGS:    LOWER CHEST: Within normal limits.    LIVER: Within normal limits.  BILE DUCTS: Mild dilatation of intrahepatic ducts. Common bile measures 4   mm.  GALLBLADDER: Within normal limits.  SPLEEN: Within normal limits.  PANCREAS: Within normal limits.  ADRENALS: Within normal limits.  KIDNEYS/URETERS: Cysts.    VISUALIZEDPORTIONS:    BOWEL: Within normal limits.   PERITONEUM: No ascites.  VESSELS: Within normal limits.  RETROPERITONEUM: No lymphadenopathy.    ABDOMINAL WALL: Within normal limits.  BONES: Within normal limits.    IMPRESSION: Mild dilatation of of intrahepatic ducts could be a normal   variant. Normal common bile duct. Normal pancreas.      HONEY REILLY M.D., ATTENDING RADIOLOGIST  This document has been electronically signed. Feb 1 2018 12:51PM      -____________________ INTERVAL HPI/OVERNIGHT EVENTS:    still c/o some abdominal discomfort and nausea/bloating sensation   no vomiting  tolerating some of her diet, but reports "not my usual amount"    MEDICATIONS  (STANDING):  amLODIPine   Tablet 5 milliGRAM(s) Oral daily  amLODIPine   Tablet 2.5 milliGRAM(s) Oral once  aspirin enteric coated 81 milliGRAM(s) Oral daily  enalapril 2.5 milliGRAM(s) Oral daily  gabapentin 100 milliGRAM(s) Oral daily  LORazepam   Injectable 0.5 milliGRAM(s) IV Push once  metoprolol     tartrate 12.5 milliGRAM(s) Oral two times a day  pantoprazole    Tablet 40 milliGRAM(s) Oral two times a day before meals  polyethylene glycol 3350 17 Gram(s) Oral two times a day  simethicone 80 milliGRAM(s) Chew two times a day  sodium chloride 0.9% lock flush 3 milliLiter(s) IV Push every 8 hours  sucralfate 1 Gram(s) Oral two times a day    MEDICATIONS  (PRN):  acetaminophen   Tablet. 650 milliGRAM(s) Oral every 6 hours PRN Moderate Pain (4 - 6)  bisacodyl Suppository 10 milliGRAM(s) Rectal daily PRN Constipation  calcium carbonate 500 mG (Tums) Chewable 1 Tablet(s) Chew every 6 hours PRN Gi upset  ondansetron Injectable 4 milliGRAM(s) IV Push every 6 hours PRN Nausea and/or Vomiting      Allergies    No Known Allergies    Intolerances        Review of Systems:    General:  No wt loss, fevers, chills, night sweats, fatigue   Eyes:  Good vision, no reported pain  ENT:  No sore throat, pain, runny nose, dysphagia  CV:  No pain, palpitations, hypo/hypertension  Resp:  No dyspnea, cough, tachypnea, wheezing  GI:  +pain, +nausea, No vomiting, No diarrhea, No constipation, No weight loss, No fever, No pruritis, No rectal bleeding, No melena, No dysphagia  :  No pain, bleeding, incontinence, nocturia  Muscle:  No pain, weakness  Neuro:  No weakness, tingling, memory problems  Psych:  No fatigue, insomnia, mood problems, depression  Endocrine:  No polyuria, polydypsia, cold/heat intolerance  Heme:  No petechiae, ecchymosis, easy bruisability  Skin:  No rash, tattoos, scars, edema      Vital Signs Last 24 Hrs  T(C): 36.6 (05 Feb 2018 12:58), Max: 37.2 (04 Feb 2018 20:53)  T(F): 97.8 (05 Feb 2018 12:58), Max: 99 (04 Feb 2018 20:53)  HR: 78 (05 Feb 2018 12:58) (72 - 102)  BP: 170/78 (05 Feb 2018 12:58) (132/78 - 170/78)  BP(mean): --  RR: 18 (05 Feb 2018 12:58) (17 - 18)  SpO2: 98% (05 Feb 2018 12:58) (98% - 100%)    PHYSICAL EXAM:    Constitutional: NAD  HEENT: EOMI, throat clear  Neck: No LAD, supple  Respiratory: CTA and P  Cardiovascular: S1 and S2, RRR, no M  Gastrointestinal: BS+, soft, NT/ND, neg HSM,  Extremities: No peripheral edema, neg clubbing, cyanosis  Vascular: 2+ peripheral pulses  Neurological: A/O x 3, no focal deficits  Psychiatric: Normal mood, normal affect  Skin: No rashes      LABS:                        16.0   9.96  )-----------( 293      ( 04 Feb 2018 05:00 )             46.3     02-04    138  |  98  |  13  ----------------------------<  103<H>  4.0   |  27  |  0.72    Ca    9.5      04 Feb 2018 05:00  Mg     2.5     02-04            RADIOLOGY & ADDITIONAL TESTS:  < from: Upper Endoscopy (02.02.18 @ 16:04) >    [facility]  _______________________________________________________________________________  Patient Name: Shahana Singh         Procedure Date: 2/2/2018 4:04 PM  MRN: [id number]                     Account Number: [id number]  YOB: 1940               Admit Type: Inpatient  Room: [facility] 03                         Gender: Female  Attending MD: JANIAY VERDIN DO        _______________________________________________________________________________     Procedure:           Upper GI endoscopy  Indications:         Epigastric abdominal pain  Providers:           JANIYA VERDIN DO  Medicines:           Monitored Anesthesia Care  Complications:       No immediate complications.  Procedure:           After obtaining informed consent, the endoscope was                        passed under direct vision. Throughout the procedure,                        the patient's blood pressure, pulse, and oxygen                        saturations were monitored continuously. The Endoscope                        was introduced through the mouth, and advanced to the                        second part of duodenum. The upper GI endoscopy was                        accomplished without difficulty. The patient tolerated                    the procedure well.                                                                                   Findings:       The examined esophagus was normal.       One non-bleeding superficial gastric ulcer with no stigmata of bleeding       was found in the gastric body. Biopsies were taken with a cold forceps        for histology. Estimated blood loss: none.       The examined duodenum was normal.                                                                                   Impression:          - Normal esophagus.                       - Non-bleeding gastric ulcer with no stigmata of                        bleeding. Biopsied.                       - Normal examined duodenum.  Recommendation:      - [facility].                       - Resume regular diet.                       - Await pathology results.                       - Use Protonix (pantoprazole) 40 mg PO BID.                 Attending Participation:       I personally performed the entire procedure. I was present and        participated during the entire procedure, including non-key portions.                     _________________  JANIYA VERIDN DO  2/2/2018 4:49:00 PM  This report has been signed electronically.  Number of Addenda: 0    Note Initiated On: 2/2/2018 4:04 PM    < end of copied text >      ____________    < from: MR MRCP w/wo IV Cont (02.01.18 @ 12:05) >    EXAM:  MR MRCP [facility] IC        PROCEDURE DATE:  Feb 1 2018         INTERPRETATION:  CLINICAL INFORMATION: Question biliary ductal   dilatation/pancreatic lesion on CT. Myocardial infarct.    COMPARISON: CT from January 28, 2018    PROCEDURE:   MRI of the abdomen was performed with and without intravenous contrast.  5 mL of Gadavist was administered intravenously without complication and   2.5 mL was discarded.      FINDINGS:    LOWER CHEST: Within normal limits.    LIVER: Within normal limits.  BILE DUCTS: Mild dilatation of intrahepatic ducts. Common bile measures 4   mm.  GALLBLADDER: Within normal limits.  SPLEEN: Within normal limits.  PANCREAS: Within normal limits.  ADRENALS: Within normal limits.  KIDNEYS/URETERS: Cysts.    VISUALIZEDPORTIONS:    BOWEL: Within normal limits.   PERITONEUM: No ascites.  VESSELS: Within normal limits.  RETROPERITONEUM: No lymphadenopathy.    ABDOMINAL WALL: Within normal limits.  BONES: Within normal limits.    IMPRESSION: Mild dilatation of of intrahepatic ducts could be a normal   variant. Normal common bile duct. Normal pancreas.      HONEY REILLY M.D., ATTENDING RADIOLOGIST  This document has been electronically signed. Feb 1 2018 12:51PM      -____________________

## 2025-03-12 NOTE — PHYSICAL THERAPY INITIAL EVALUATION ADULT - PERSONAL SAFETY AND JUDGMENT, REHAB EVAL
----- Message from Evy sent at 3/12/2025 12:14 PM CDT -----  Type:  Needs Medical Advice/PHARMACY CHANGE Who Called: RomPharmkendall name and phone #:  [facility]: [address] [place] [address]Zqyky: [phone] the patient rather a call back or a response via [facility]? Call Best Call Back Number: [phone] Information: pt requesting to have oxyCODONE-acetaminophen (PERCOCET) 7.5-325 mg per tablet changed to a different pharmacy [facility] out of medication contact once submitted   intact